# Patient Record
Sex: FEMALE | Race: WHITE | NOT HISPANIC OR LATINO | Employment: FULL TIME | URBAN - METROPOLITAN AREA
[De-identification: names, ages, dates, MRNs, and addresses within clinical notes are randomized per-mention and may not be internally consistent; named-entity substitution may affect disease eponyms.]

---

## 2023-05-08 LAB — SARS-COV-2 RESULT: NOT DETECTED

## 2023-05-11 ENCOUNTER — APPOINTMENT (OUTPATIENT)
Dept: PRIMARY CARE | Facility: CLINIC | Age: 38
End: 2023-05-11
Payer: COMMERCIAL

## 2023-05-11 ENCOUNTER — DOCUMENTATION (OUTPATIENT)
Dept: PRIMARY CARE | Facility: CLINIC | Age: 38
End: 2023-05-11
Payer: COMMERCIAL

## 2023-05-11 DIAGNOSIS — J06.9 VIRAL UPPER RESPIRATORY TRACT INFECTION: Primary | ICD-10-CM

## 2023-05-11 ASSESSMENT — ENCOUNTER SYMPTOMS
COUGH: 1
SORE THROAT: 1
SHORTNESS OF BREATH: 0
VOMITING: 0
WHEEZING: 0
HEADACHES: 0
FATIGUE: 1
TROUBLE SWALLOWING: 0
DIZZINESS: 0
FEVER: 0
NAUSEA: 0

## 2023-05-11 NOTE — PROGRESS NOTES
Subjective   Patient ID: Freya Sanz is a 37 y.o. female who presents for No chief complaint on file..  URI sx  Began 6 days ago with ST, some nasal congestion but very fatigued  Some cough, no F/C  COVID (-)  Some TheraFlu helping and feels better overall other than fatigue.  Works with [employer]        Review of Systems   Constitutional:  Positive for fatigue. Negative for fever.   HENT:  Positive for postnasal drip and sore throat. Negative for trouble swallowing.    Respiratory:  Positive for cough. Negative for shortness of breath and wheezing.    Cardiovascular:  Negative for chest pain and leg swelling.   Gastrointestinal:  Negative for nausea and vomiting.   Neurological:  Negative for dizziness and headaches.       Objective   Physical Exam  Constitutional:       Comments: NAD, mildly ill appearing   HENT:      Head: Normocephalic.      Nose:      Comments: Some mild nasal sx present  Pulmonary:      Effort: Pulmonary effort is normal.   Neurological:      General: No focal deficit present.      Mental Status: She is alert.         Assessment/Plan   Diagnoses and all orders for this visit:  Viral upper respiratory tract infection    Your symptoms are consistent with a viral infection and the progression you describe is typical.  Consider adding Astepro (OTC) and your favorite antihistamine ( no decongestant)  to help manage nasal symptoms and post nasal drainage.  If any of your symptoms return, or if you have a ny new ones, then let your PCP know.  The toatl course is usually 7-10 days.  Any sudden changes go to ED.

## 2023-09-21 PROBLEM — B97.7 HPV IN FEMALE: Status: ACTIVE | Noted: 2023-09-21

## 2023-09-21 PROBLEM — D22.5 MELANOCYTIC NEVI OF TRUNK: Status: ACTIVE | Noted: 2023-01-05

## 2023-09-21 PROBLEM — M54.16 LEFT LUMBAR RADICULOPATHY: Status: ACTIVE | Noted: 2023-09-21

## 2023-09-21 PROBLEM — S99.921A RIGHT FOOT INJURY, INITIAL ENCOUNTER: Status: ACTIVE | Noted: 2023-09-21

## 2023-09-21 PROBLEM — M54.42 CHRONIC LEFT-SIDED LOW BACK PAIN WITH LEFT-SIDED SCIATICA: Status: ACTIVE | Noted: 2023-09-21

## 2023-09-21 PROBLEM — F17.200 CURRENT SMOKER: Status: ACTIVE | Noted: 2023-09-21

## 2023-09-21 PROBLEM — M54.9 BACK PAIN: Status: ACTIVE | Noted: 2023-09-21

## 2023-09-21 PROBLEM — M53.3 SACRAL BACK PAIN: Status: ACTIVE | Noted: 2023-09-21

## 2023-09-21 PROBLEM — M46.1 SACROILIITIS (CMS-HCC): Status: ACTIVE | Noted: 2023-09-21

## 2023-09-21 PROBLEM — M99.09 SEGMENTAL AND SOMATIC DYSFUNCTION: Status: ACTIVE | Noted: 2023-09-21

## 2023-09-21 PROBLEM — M54.50 ACUTE LEFT-SIDED LOW BACK PAIN WITHOUT SCIATICA: Status: ACTIVE | Noted: 2023-09-21

## 2023-09-21 PROBLEM — H52.13 BILATERAL MYOPIA: Status: ACTIVE | Noted: 2023-09-21

## 2023-09-21 PROBLEM — D22.70 MELANOCYTIC NEVI OF LOWER EXTREMITY OR HIP: Status: ACTIVE | Noted: 2023-01-05

## 2023-09-21 PROBLEM — M79.10 MYALGIA: Status: ACTIVE | Noted: 2023-09-21

## 2023-09-21 PROBLEM — R10.2 PELVIC PAIN IN FEMALE: Status: ACTIVE | Noted: 2023-09-21

## 2023-09-21 PROBLEM — S93.401A SPRAIN OF RIGHT ANKLE, INITIAL ENCOUNTER: Status: ACTIVE | Noted: 2023-09-21

## 2023-09-21 PROBLEM — S92.514A CLOSED NONDISPLACED FRACTURE OF PROXIMAL PHALANX OF LESSER TOE OF RIGHT FOOT: Status: ACTIVE | Noted: 2023-09-21

## 2023-09-21 PROBLEM — M54.2 CERVICALGIA: Status: ACTIVE | Noted: 2023-09-21

## 2023-09-21 PROBLEM — L81.4 OTHER MELANIN HYPERPIGMENTATION: Status: ACTIVE | Noted: 2023-01-05

## 2023-09-21 PROBLEM — D18.01 HEMANGIOMA OF SKIN AND SUBCUTANEOUS TISSUE: Status: ACTIVE | Noted: 2023-01-05

## 2023-09-21 PROBLEM — L57.8 OTHER SKIN CHANGES DUE TO CHRONIC EXPOSURE TO NONIONIZING RADIATION: Status: ACTIVE | Noted: 2023-01-05

## 2023-09-21 PROBLEM — Z86.39 HISTORY OF VITAMIN D DEFICIENCY: Status: ACTIVE | Noted: 2023-09-21

## 2023-09-21 PROBLEM — E55.9 VITAMIN D INSUFFICIENCY: Status: ACTIVE | Noted: 2023-09-21

## 2023-09-21 PROBLEM — M79.9 POSTURAL STRAIN: Status: ACTIVE | Noted: 2023-09-21

## 2023-09-21 PROBLEM — M47.817 SPONDYLOSIS, LUMBOSACRAL: Status: ACTIVE | Noted: 2023-09-21

## 2023-09-21 PROBLEM — G89.29 CHRONIC LEFT-SIDED LOW BACK PAIN WITH LEFT-SIDED SCIATICA: Status: ACTIVE | Noted: 2023-09-21

## 2023-09-21 PROBLEM — L81.3 CAFE AU LAIT SPOTS: Status: ACTIVE | Noted: 2023-01-05

## 2023-09-21 PROBLEM — F17.200 NICOTINE USE DISORDER: Status: ACTIVE | Noted: 2023-09-21

## 2023-09-21 PROBLEM — R06.02 SHORTNESS OF BREATH ON EXERTION: Status: ACTIVE | Noted: 2023-09-21

## 2023-09-21 PROBLEM — D22.39 MELANOCYTIC NEVI OF OTHER PARTS OF FACE: Status: ACTIVE | Noted: 2023-01-05

## 2023-09-21 PROBLEM — F33.42 MAJOR DEPRESSIVE DISORDER, RECURRENT EPISODE, IN FULL REMISSION (CMS-HCC): Status: ACTIVE | Noted: 2023-09-21

## 2023-09-21 RX ORDER — CLINDAMYCIN PHOSPHATE 1 G/10ML
GEL TOPICAL
COMMUNITY
Start: 2022-12-08 | End: 2023-10-05 | Stop reason: ALTCHOICE

## 2023-09-21 RX ORDER — SERTRALINE HYDROCHLORIDE 100 MG/1
2 TABLET, FILM COATED ORAL DAILY
COMMUNITY
Start: 2015-01-13 | End: 2023-10-18 | Stop reason: SDUPTHER

## 2023-09-21 RX ORDER — HYDROXYZINE HYDROCHLORIDE 10 MG/1
TABLET, FILM COATED ORAL
COMMUNITY
Start: 2023-08-10 | End: 2024-03-14 | Stop reason: SDUPTHER

## 2023-09-21 RX ORDER — MELATONIN 10 MG/ML
DROPS ORAL
COMMUNITY
Start: 2019-12-05

## 2023-09-21 RX ORDER — CLINDAMYCIN PHOSPHATE 10 MG/G
GEL TOPICAL
COMMUNITY
Start: 2023-01-05

## 2023-09-21 RX ORDER — IBUPROFEN 800 MG/1
1 TABLET ORAL 3 TIMES DAILY PRN
COMMUNITY
Start: 2021-09-27

## 2023-09-21 RX ORDER — NORETHINDRONE 5 MG/1
1 TABLET ORAL DAILY
COMMUNITY
Start: 2021-03-04 | End: 2023-10-19 | Stop reason: SDUPTHER

## 2023-09-21 RX ORDER — BUPROPION HYDROCHLORIDE 150 MG/1
1 TABLET, EXTENDED RELEASE ORAL DAILY
COMMUNITY
Start: 2019-12-02 | End: 2023-10-18 | Stop reason: SDUPTHER

## 2023-09-26 DIAGNOSIS — E55.9 VITAMIN D INSUFFICIENCY: Primary | ICD-10-CM

## 2023-09-26 DIAGNOSIS — Z00.00 ANNUAL PHYSICAL EXAM: ICD-10-CM

## 2023-10-04 ENCOUNTER — LAB (OUTPATIENT)
Dept: LAB | Facility: LAB | Age: 38
End: 2023-10-04
Payer: COMMERCIAL

## 2023-10-04 DIAGNOSIS — Z00.00 ANNUAL PHYSICAL EXAM: ICD-10-CM

## 2023-10-04 DIAGNOSIS — E55.9 VITAMIN D INSUFFICIENCY: ICD-10-CM

## 2023-10-04 LAB
25(OH)D3 SERPL-MCNC: 35 NG/ML (ref 30–100)
CHOLEST SERPL-MCNC: 149 MG/DL (ref 0–199)
CHOLESTEROL/HDL RATIO: 3.8
EST. AVERAGE GLUCOSE BLD GHB EST-MCNC: 108 MG/DL
HBA1C MFR BLD: 5.4 %
HDLC SERPL-MCNC: 38.9 MG/DL
LDLC SERPL CALC-MCNC: 94 MG/DL (ref 140–190)
NON HDL CHOLESTEROL: 110 MG/DL (ref 0–149)
TRIGL SERPL-MCNC: 83 MG/DL (ref 0–149)
TSH SERPL-ACNC: 2.71 MIU/L (ref 0.44–3.98)
VLDL: 17 MG/DL (ref 0–40)

## 2023-10-04 PROCEDURE — 36415 COLL VENOUS BLD VENIPUNCTURE: CPT

## 2023-10-05 ENCOUNTER — TELEMEDICINE (OUTPATIENT)
Dept: BEHAVIORAL HEALTH | Facility: CLINIC | Age: 38
End: 2023-10-05
Payer: COMMERCIAL

## 2023-10-05 ENCOUNTER — OFFICE VISIT (OUTPATIENT)
Dept: PRIMARY CARE | Facility: CLINIC | Age: 38
End: 2023-10-05
Payer: COMMERCIAL

## 2023-10-05 VITALS
SYSTOLIC BLOOD PRESSURE: 122 MMHG | HEIGHT: 62 IN | WEIGHT: 162.4 LBS | TEMPERATURE: 97.3 F | DIASTOLIC BLOOD PRESSURE: 70 MMHG | HEART RATE: 100 BPM | BODY MASS INDEX: 29.88 KG/M2 | OXYGEN SATURATION: 95 %

## 2023-10-05 DIAGNOSIS — Z63.8 STRESS DUE TO FAMILY TENSION: ICD-10-CM

## 2023-10-05 DIAGNOSIS — Z86.39 HISTORY OF VITAMIN D DEFICIENCY: ICD-10-CM

## 2023-10-05 DIAGNOSIS — F41.9 ANXIETY AND DEPRESSION: ICD-10-CM

## 2023-10-05 DIAGNOSIS — F41.9 ANXIETY: ICD-10-CM

## 2023-10-05 DIAGNOSIS — F32.A ANXIETY AND DEPRESSION: ICD-10-CM

## 2023-10-05 DIAGNOSIS — K29.00 OTHER ACUTE GASTRITIS WITHOUT HEMORRHAGE: Primary | ICD-10-CM

## 2023-10-05 DIAGNOSIS — F17.200 CURRENT SMOKER: ICD-10-CM

## 2023-10-05 PROBLEM — J06.9 VIRAL UPPER RESPIRATORY TRACT INFECTION: Status: RESOLVED | Noted: 2023-05-11 | Resolved: 2023-10-05

## 2023-10-05 PROBLEM — L70.0 ACNE VULGARIS: Status: ACTIVE | Noted: 2023-01-05

## 2023-10-05 PROBLEM — N93.9 ABNORMAL UTERINE BLEEDING (AUB): Status: ACTIVE | Noted: 2023-10-05

## 2023-10-05 PROBLEM — Q76.49 BERTOLOTTI'S SYNDROME: Status: ACTIVE | Noted: 2023-10-05

## 2023-10-05 PROBLEM — H52.203 ASTIGMATISM, BILATERAL: Status: ACTIVE | Noted: 2023-10-05

## 2023-10-05 PROBLEM — R87.610 ASCUS WITH POSITIVE HIGH RISK HPV CERVICAL: Status: ACTIVE | Noted: 2023-10-05

## 2023-10-05 PROBLEM — R87.810 ASCUS WITH POSITIVE HIGH RISK HPV CERVICAL: Status: ACTIVE | Noted: 2023-10-05

## 2023-10-05 PROCEDURE — 90837 PSYTX W PT 60 MINUTES: CPT | Performed by: COUNSELOR

## 2023-10-05 PROCEDURE — 99396 PREV VISIT EST AGE 40-64: CPT | Performed by: NURSE PRACTITIONER

## 2023-10-05 RX ORDER — OMEPRAZOLE 40 MG/1
40 CAPSULE, DELAYED RELEASE ORAL AS NEEDED
COMMUNITY
End: 2023-10-18 | Stop reason: SDUPTHER

## 2023-10-05 SDOH — SOCIAL STABILITY - SOCIAL INSECURITY: OTHER SPECIFIED PROBLEMS RELATED TO PRIMARY SUPPORT GROUP: Z63.8

## 2023-10-05 ASSESSMENT — ENCOUNTER SYMPTOMS
CARDIOVASCULAR NEGATIVE: 1
APPETITE CHANGE: 1
RESPIRATORY NEGATIVE: 1
ALLERGIC/IMMUNOLOGIC NEGATIVE: 1
EYES NEGATIVE: 1
MYALGIAS: 1
GASTROINTESTINAL NEGATIVE: 1
NEUROLOGICAL NEGATIVE: 1
NERVOUS/ANXIOUS: 1

## 2023-10-05 ASSESSMENT — PAIN SCALES - GENERAL: PAINLEVEL: 0-NO PAIN

## 2023-10-05 NOTE — ASSESSMENT & PLAN NOTE
Significant, ongoing family stressors   Support from 2 counselors and psychiatry. Reports having access to mental health resources

## 2023-10-05 NOTE — ASSESSMENT & PLAN NOTE
Anxiety stable on wellbutrin and zoloft   Discussed non-pharmacologic stress relief techniques, pt interested in stretching every day

## 2023-10-05 NOTE — PATIENT INSTRUCTIONS
Start stretching, google full body beginner stretches  Establish some type of daily re;laxation activity for yourself.  There are apps like Calm or HeadSpace that are helpful    Your Vitamin D level/ you may supplement it with an over the counter Vitamin D supplement, 2000 units daily or a Multi Vitamin with D. Look for the USP label so you know the supplement is verified to be pure and not fillers.   If you chooses not to take a supplement look for fortified foods like OJ, Soy milk, Cereal or natural sources Egg yolks, [place], Canned tuna, mushrooms, Sardines,    You are not alone  Everyone has struggles in life  Many people are out there to listen, and help, and not  you  Never be afraid to ask    Here is a list of Mental Health Resources;    Suicide and Crisis Hotline  DIAL 988    Mobile Crisis  160.283.6490   or Text 4HOPE to 216463    Suicide Prevention Lifeline  1-435.928.8405    Recovery Resources   [address]  [phone]  Substance Abuse  Mental Health  Psychiatric Emergency Walk In clinic    [facility]  [address] [facility]   [phone]   Substance abuse   Psychiatric Emergency Walk In clinic      Top rated On Line Resources; Just Google the name for the link to follow    Anne-Marie     Cerebral     Talkspace    Teen Counseling    Pride Counseling     Health Maintenance  Continue to follow a healthy diet with fruits and vegetables at most meals.  Daily exercise is recommended as well as adding weights 3 times a week to maintain muscle mass and for bone health.  It is recommended you drink 6 to 8 glasses of water daily, more when you are exerting yourself or in hot weather.  Make sure you follow the health screening guidelines and keep up to date on your immunizations!

## 2023-10-05 NOTE — ASSESSMENT & PLAN NOTE
9/25 ED visit, poor diet, significant home stress, daily alcohol use and current smoker  Has follow up with GI in 1 week  Continue Omeprazole PRN for heartburn symptoms  Small, bland meals 5 times per day
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
## 2023-10-12 ENCOUNTER — OFFICE VISIT (OUTPATIENT)
Dept: GASTROENTEROLOGY | Facility: CLINIC | Age: 38
End: 2023-10-12
Payer: COMMERCIAL

## 2023-10-12 VITALS
WEIGHT: 163 LBS | HEART RATE: 85 BPM | HEIGHT: 62 IN | TEMPERATURE: 98 F | BODY MASS INDEX: 30 KG/M2 | DIASTOLIC BLOOD PRESSURE: 81 MMHG | SYSTOLIC BLOOD PRESSURE: 117 MMHG

## 2023-10-12 DIAGNOSIS — R10.13 EPIGASTRIC ABDOMINAL PAIN: Primary | ICD-10-CM

## 2023-10-12 DIAGNOSIS — K29.00 ACUTE SUPERFICIAL GASTRITIS WITHOUT HEMORRHAGE: ICD-10-CM

## 2023-10-12 DIAGNOSIS — R14.0 BLOATING: ICD-10-CM

## 2023-10-12 DIAGNOSIS — K21.9 GASTROESOPHAGEAL REFLUX DISEASE WITHOUT ESOPHAGITIS: ICD-10-CM

## 2023-10-12 PROCEDURE — 99213 OFFICE O/P EST LOW 20 MIN: CPT | Performed by: NURSE PRACTITIONER

## 2023-10-12 PROCEDURE — 99203 OFFICE O/P NEW LOW 30 MIN: CPT | Performed by: NURSE PRACTITIONER

## 2023-10-12 RX ORDER — OMEPRAZOLE 20 MG/1
20 CAPSULE, DELAYED RELEASE ORAL ONCE
Status: SHIPPED | OUTPATIENT
Start: 2023-10-12

## 2023-10-12 NOTE — PROGRESS NOTES
Freya Sanz is a 38 y.o. female with a PMH of major depressive disorder, PTSD   who presents today for  epigastric pain.  Seen in the ED, had a GI cocktail. Symptoms have been present for approximately 2 day. Symptoms include abdominal bloating, belching, early satiety, heartburn, and midespigastric pain. The patient denies   chest pain, choking on food, cough, difficulty swallowing, early satiety, hematemesis, hoarseness, melena, midespigastric pain, and odynophagia The patient denies early satiety. Symptoms appear to be worsened by alcohol, caffeine, and large meals. Risk factors present for GERD include tobacco abuse, alcohol use, and stress .  Studies performed so far include none. Treatments tried so far include Omeprazole but she is not using this daily. Results of treatment: considerable improvement in symptom frequency.     Past Surgical History:   Procedure Laterality Date    MOUTH SURGERY  02/25/2016    Oral Surgery Tooth Extraction    TONSILLECTOMY  02/25/2016    Tonsillectomy     Family Hx: Cancer-related family history is not on file. Denies a family hx of CRC, GI cancers, Oneal's esophagus   Social history: alcohol intake is 1-3 beers daily, smoking 3 cigarettes daily         Current Outpatient Medications   Medication Sig Dispense Refill    buPROPion SR (Wellbutrin SR) 150 mg 12 hr tablet Take 1 tablet (150 mg) by mouth once daily.      cholecalciferol, vitamin D3, 50 mcg (2,000 unit) tablet,chewable Chew.      clindamycin (Clindagel) 1 % gel       hydrOXYzine HCL (Atarax) 10 mg tablet Take by mouth.      ibuprofen 800 mg tablet Take 1 tablet (800 mg) by mouth 3 times a day as needed.      multivitamin-Ca-iron-minerals (Tab-A-Albert Womens) 27-0.4 mg tablet       naloxone (Narcan) 4 mg/0.1 mL nasal spray INSTILL 1 SPRAY IN ONE NOSTRIL AS NEEDED FOR ACCIDENTAL OPIOID OVERDOSE; REPEAT WITH SECOND DOSE IN 5 MINUTES IF NO RESPONSE 4 each 0    norethindrone (Aygestin) 5 mg tablet Take 1 tablet (5 mg)  by mouth once daily.      omeprazole (PriLOSEC) 40 mg DR capsule Take 1 capsule (40 mg) by mouth if needed (if symptomatic). Do not crush or chew.      sertraline (Zoloft) 100 mg tablet Take 2 tablets (200 mg) by mouth once daily.       No current facility-administered medications for this visit.   Review of Systems   Constitutional:  Negative for chills, diaphoresis, fatigue and fever.   HENT: Negative.     Eyes: Negative.    Respiratory: Negative.  Negative for apnea, cough, chest tightness, shortness of breath, wheezing and stridor.    Cardiovascular: Negative.    Gastrointestinal:         See HPI    Endocrine: Negative.    Genitourinary: Negative.  Negative for difficulty urinating.   Musculoskeletal: Negative.    Skin: Negative.    Allergic/Immunologic: Negative.    Neurological: Negative.    Hematological: Negative.    Psychiatric/Behavioral: Negative.     All other systems reviewed and are negative.       Physical Exam  Constitutional:       Appearance: She is normal weight.   Eyes:      Pupils: Pupils are equal, round, and reactive to light.   Cardiovascular:      Rate and Rhythm: Normal rate and regular rhythm.      Heart sounds: Normal heart sounds.   Pulmonary:      Effort: Pulmonary effort is normal.      Breath sounds: Normal breath sounds.   Abdominal:      Tenderness: There is abdominal tenderness.   Musculoskeletal:      Cervical back: Normal range of motion.   Skin:     General: Skin is warm and dry.   Neurological:      Mental Status: She is alert.   Psychiatric:         Mood and Affect: Mood normal.           ASSESSMENT:  DYSPEPSIA, BLOATING       PLAN:  The pathophysiology of reflux is discussed.  Anti-reflux measures such as raising the head of the bed, avoiding tight clothing or belts, avoiding eating 3-4 hours before bedtime and not lying down shortly after mealtime and achieving weight loss are discussed. Avoid ASA, NSAID's, caffeine, chocolate, peppermints, spicy and fatty foods, alcohol and  tobacco. Further recommendations to her: Start Omeprazole  daily about 30-60 minutes prior to breakfast. At this time an EGD is warranted for evaluation of New-onset dyspepsia Follow-up in 1-2 months.

## 2023-10-12 NOTE — PATIENT INSTRUCTIONS
GERD - Avoid overeating at mealtime.  Stay upright for 2 hours after eating.  Do not eat for 3-4 hours before going to bed.  Elevated the head of the bed 6 inches when you are sleeping. Avoid excessive amounts of fried foods, acidic foods, spicy foods, alcohol, caffeine, peppermint, spearmint, chocolate, non-steroidal anti- inflammatory medications, such as, Ibuprofen, Advil, Motrin, Aleve, Naproxen, Naprosyn, Moloxicam, Etodolac, and Voltaren.     Start Omeprazole 20 mg daily about 30 minutes before breakfast    Follow up in 2 months

## 2023-10-13 PROBLEM — R10.13 EPIGASTRIC ABDOMINAL PAIN: Status: ACTIVE | Noted: 2023-10-13

## 2023-10-13 PROBLEM — R14.0 BLOATING: Status: ACTIVE | Noted: 2023-10-13

## 2023-10-13 ASSESSMENT — ENCOUNTER SYMPTOMS
FATIGUE: 0
WHEEZING: 0
PSYCHIATRIC NEGATIVE: 1
CHEST TIGHTNESS: 0
APNEA: 0
CHILLS: 0
ALLERGIC/IMMUNOLOGIC NEGATIVE: 1
MUSCULOSKELETAL NEGATIVE: 1
STRIDOR: 0
NEUROLOGICAL NEGATIVE: 1
ENDOCRINE NEGATIVE: 1
COUGH: 0
SHORTNESS OF BREATH: 0
ROS GI COMMENTS: SEE HPI
DIFFICULTY URINATING: 0
DIAPHORESIS: 0
FEVER: 0
CARDIOVASCULAR NEGATIVE: 1
RESPIRATORY NEGATIVE: 1
EYES NEGATIVE: 1
HEMATOLOGIC/LYMPHATIC NEGATIVE: 1

## 2023-10-18 ENCOUNTER — PATIENT MESSAGE (OUTPATIENT)
Dept: OBSTETRICS AND GYNECOLOGY | Facility: CLINIC | Age: 38
End: 2023-10-18
Payer: COMMERCIAL

## 2023-10-18 ENCOUNTER — PHARMACY VISIT (OUTPATIENT)
Dept: PHARMACY | Facility: CLINIC | Age: 38
End: 2023-10-18
Payer: COMMERCIAL

## 2023-10-18 DIAGNOSIS — F41.9 ANXIETY: ICD-10-CM

## 2023-10-18 DIAGNOSIS — R10.13 EPIGASTRIC ABDOMINAL PAIN: Primary | ICD-10-CM

## 2023-10-18 DIAGNOSIS — N93.9 ABNORMAL UTERINE BLEEDING (AUB): Primary | ICD-10-CM

## 2023-10-18 DIAGNOSIS — F33.42 MAJOR DEPRESSIVE DISORDER, RECURRENT EPISODE, IN FULL REMISSION (CMS-HCC): ICD-10-CM

## 2023-10-18 PROCEDURE — RXMED WILLOW AMBULATORY MEDICATION CHARGE

## 2023-10-18 RX ORDER — BUPROPION HYDROCHLORIDE 150 MG/1
150 TABLET, EXTENDED RELEASE ORAL DAILY
Qty: 90 TABLET | Refills: 0 | Status: SHIPPED | OUTPATIENT
Start: 2023-10-18 | End: 2023-11-14 | Stop reason: SDUPTHER

## 2023-10-18 RX ORDER — SERTRALINE HYDROCHLORIDE 100 MG/1
200 TABLET, FILM COATED ORAL DAILY
Qty: 180 TABLET | Refills: 0 | Status: SHIPPED | OUTPATIENT
Start: 2023-10-18 | End: 2023-12-28 | Stop reason: SDUPTHER

## 2023-10-18 RX ORDER — OMEPRAZOLE 40 MG/1
40 CAPSULE, DELAYED RELEASE ORAL AS NEEDED
Qty: 30 CAPSULE | Refills: 2 | Status: SHIPPED | OUTPATIENT
Start: 2023-10-18 | End: 2024-03-17

## 2023-10-19 ENCOUNTER — APPOINTMENT (OUTPATIENT)
Dept: BEHAVIORAL HEALTH | Facility: CLINIC | Age: 38
End: 2023-10-19
Payer: COMMERCIAL

## 2023-10-19 ENCOUNTER — PHARMACY VISIT (OUTPATIENT)
Dept: PHARMACY | Facility: CLINIC | Age: 38
End: 2023-10-19
Payer: COMMERCIAL

## 2023-10-19 PROCEDURE — RXMED WILLOW AMBULATORY MEDICATION CHARGE

## 2023-10-19 RX ORDER — NORETHINDRONE 5 MG/1
5 TABLET ORAL DAILY
Qty: 90 TABLET | Refills: 3 | Status: SHIPPED | OUTPATIENT
Start: 2023-10-19

## 2023-11-09 ENCOUNTER — TELEMEDICINE (OUTPATIENT)
Dept: BEHAVIORAL HEALTH | Facility: CLINIC | Age: 38
End: 2023-11-09
Payer: COMMERCIAL

## 2023-11-09 DIAGNOSIS — F41.8 MIXED ANXIETY AND DEPRESSIVE DISORDER: ICD-10-CM

## 2023-11-09 PROCEDURE — 90837 PSYTX W PT 60 MINUTES: CPT | Performed by: COUNSELOR
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
## 2023-11-14 DIAGNOSIS — F33.42 MAJOR DEPRESSIVE DISORDER, RECURRENT EPISODE, IN FULL REMISSION (CMS-HCC): ICD-10-CM

## 2023-11-15 ENCOUNTER — LAB REQUISITION (OUTPATIENT)
Dept: LAB | Facility: HOSPITAL | Age: 38
End: 2023-11-15
Payer: COMMERCIAL

## 2023-11-15 DIAGNOSIS — U07.1 COVID-19: ICD-10-CM

## 2023-11-15 LAB — SARS-COV-2 RNA RESP QL NAA+PROBE: DETECTED

## 2023-11-15 PROCEDURE — 87635 SARS-COV-2 COVID-19 AMP PRB: CPT

## 2023-11-15 RX ORDER — BUPROPION HYDROCHLORIDE 150 MG/1
150 TABLET, EXTENDED RELEASE ORAL DAILY
Qty: 90 TABLET | Refills: 0 | Status: SHIPPED | OUTPATIENT
Start: 2023-11-15 | End: 2023-12-28 | Stop reason: SDUPTHER

## 2023-11-17 ENCOUNTER — PHARMACY VISIT (OUTPATIENT)
Dept: PHARMACY | Facility: CLINIC | Age: 38
End: 2023-11-17
Payer: COMMERCIAL

## 2023-11-17 PROCEDURE — RXMED WILLOW AMBULATORY MEDICATION CHARGE

## 2023-11-30 ENCOUNTER — TELEMEDICINE (OUTPATIENT)
Dept: BEHAVIORAL HEALTH | Facility: CLINIC | Age: 38
End: 2023-11-30
Payer: COMMERCIAL

## 2023-11-30 DIAGNOSIS — F41.8 MIXED ANXIETY AND DEPRESSIVE DISORDER: ICD-10-CM

## 2023-11-30 PROCEDURE — 90937 HEMODIALYSIS REPEATED EVAL: CPT | Performed by: COUNSELOR

## 2023-11-30 NOTE — PROGRESS NOTES
Start time: 10:05  am  End time: 11:00 am  Total time: 55 minutes  Telehealth visit, pt consented  Diagnosis: Mixed depression and anxiety  Last visit: 11/9/2023  Freya was treated for Covid a few weeks ago and is still dealing with a cough.  She spent the Thanksgiving holiday at home with her , her 2 stepsons joined later that day. She delivered a plate to her brother who lives alone and is dealing with depression. PT returned to school 2 weeks ago and feels proud that she is ahead of her assignments.  Work is presenting a few challenges with changes, pt denies being worried.   Home life has been less stressful. Norm is participating in family therapy sessions and has been a bit more cooperative in the home. He is currently suspended from school,  due to bullying behavior for the remainder of the semester. PT continues to report symptoms of SAD. She  takes walks, eats healthy and uses positive self talk to manage symptoms. She denies anxiety or depression today.  PT notes feeling safe in her home.  Provider reviewed CBT cognitive triangle. Helped to identify ways to continue to support her family members while maintaining healthy boundaries. Reinforced consistent self care. F/U 2 weeks

## 2023-12-06 ENCOUNTER — APPOINTMENT (OUTPATIENT)
Dept: BEHAVIORAL HEALTH | Facility: CLINIC | Age: 38
End: 2023-12-06
Payer: COMMERCIAL

## 2023-12-07 ENCOUNTER — TELEMEDICINE (OUTPATIENT)
Dept: BEHAVIORAL HEALTH | Facility: CLINIC | Age: 38
End: 2023-12-07
Payer: COMMERCIAL

## 2023-12-07 DIAGNOSIS — F41.8 MIXED ANXIETY AND DEPRESSIVE DISORDER: ICD-10-CM

## 2023-12-07 PROCEDURE — 90834 PSYTX W PT 45 MINUTES: CPT | Performed by: COUNSELOR
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
## 2023-12-14 ENCOUNTER — TELEMEDICINE (OUTPATIENT)
Dept: BEHAVIORAL HEALTH | Facility: CLINIC | Age: 38
End: 2023-12-14
Payer: COMMERCIAL

## 2023-12-14 DIAGNOSIS — F43.21 GRIEF: ICD-10-CM

## 2023-12-14 DIAGNOSIS — F41.8 MIXED ANXIETY AND DEPRESSIVE DISORDER: ICD-10-CM

## 2023-12-14 PROCEDURE — 90837 PSYTX W PT 60 MINUTES: CPT | Performed by: COUNSELOR
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
## 2023-12-21 ENCOUNTER — TELEMEDICINE (OUTPATIENT)
Dept: BEHAVIORAL HEALTH | Facility: CLINIC | Age: 38
End: 2023-12-21
Payer: COMMERCIAL

## 2023-12-21 DIAGNOSIS — F41.8 MIXED ANXIETY AND DEPRESSIVE DISORDER: ICD-10-CM

## 2023-12-21 DIAGNOSIS — F43.21 GRIEVING: ICD-10-CM

## 2023-12-21 PROCEDURE — 90837 PSYTX W PT 60 MINUTES: CPT | Performed by: COUNSELOR

## 2023-12-21 NOTE — PROGRESS NOTES
"Start time: 1:36 pm  End time: 2:30 pm  Total time: 54 minutes  Telehealth visit, pt consented  Diagnosis: F41.8   Last visit: 12/14/2023 Saturday was the [facility] service for her brother Paras. Overall Freya was pleased with the turn out.  She returned to work on Monday after being off all last week, said \"it was okay.\" Tuesday was more difficult because she was not as busy. Her  Shane continues to be supportive, the stress in the home with Norm has been less this past week. Provider joined. Supported in sharing thoughts/feelings. Offered support.  Processed concerned that her mother wants to keep the lines of communication open with her.  PT is triggered with childhood memories. Remainder of the visit was spent supporting pt as she reflected on family memories. More psychoeducation was given on grieving and the tasks of mourning. Reviewed/reinforced self care and healthy coping. Used supportive therapy.  HW-  Journal  Review recommended readings  Recommended joining a grief group  Recommended reading The Grief Recovery Handbook   "

## 2023-12-28 ENCOUNTER — TELEMEDICINE (OUTPATIENT)
Dept: BEHAVIORAL HEALTH | Facility: CLINIC | Age: 38
End: 2023-12-28
Payer: COMMERCIAL

## 2023-12-28 ENCOUNTER — APPOINTMENT (OUTPATIENT)
Dept: DERMATOLOGY | Facility: CLINIC | Age: 38
End: 2023-12-28
Payer: COMMERCIAL

## 2023-12-28 DIAGNOSIS — F41.8 MIXED ANXIETY AND DEPRESSIVE DISORDER: ICD-10-CM

## 2023-12-28 DIAGNOSIS — F41.9 ANXIETY: ICD-10-CM

## 2023-12-28 DIAGNOSIS — F33.42 MAJOR DEPRESSIVE DISORDER, RECURRENT EPISODE, IN FULL REMISSION (CMS-HCC): ICD-10-CM

## 2023-12-28 DIAGNOSIS — F43.21 GRIEF: ICD-10-CM

## 2023-12-28 DIAGNOSIS — F33.0 MILD EPISODE OF RECURRENT MAJOR DEPRESSIVE DISORDER (CMS-HCC): ICD-10-CM

## 2023-12-28 DIAGNOSIS — F41.0 PANIC DISORDER: ICD-10-CM

## 2023-12-28 PROCEDURE — 90837 PSYTX W PT 60 MINUTES: CPT | Performed by: COUNSELOR

## 2023-12-28 PROCEDURE — 99213 OFFICE O/P EST LOW 20 MIN: CPT | Performed by: CLINICAL NURSE SPECIALIST

## 2023-12-28 PROCEDURE — RXMED WILLOW AMBULATORY MEDICATION CHARGE

## 2023-12-28 RX ORDER — BUPROPION HYDROCHLORIDE 150 MG/1
150 TABLET, EXTENDED RELEASE ORAL DAILY
Qty: 90 TABLET | Refills: 0 | Status: SHIPPED | OUTPATIENT
Start: 2023-12-28 | End: 2024-01-25 | Stop reason: SDUPTHER

## 2023-12-28 RX ORDER — SERTRALINE HYDROCHLORIDE 100 MG/1
200 TABLET, FILM COATED ORAL DAILY
Qty: 180 TABLET | Refills: 0 | Status: SHIPPED | OUTPATIENT
Start: 2023-12-28 | End: 2024-01-25 | Stop reason: SDUPTHER

## 2023-12-28 RX ORDER — BUSPIRONE HYDROCHLORIDE 5 MG/1
5 TABLET ORAL 2 TIMES DAILY
Qty: 180 TABLET | Refills: 0 | Status: SHIPPED | OUTPATIENT
Start: 2023-12-28 | End: 2024-01-25 | Stop reason: SDUPTHER
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
## 2023-12-28 NOTE — PROGRESS NOTES
Outpatient Psychiatry      Subjective   Freya Sanz, a 38 y.o. female, presents for a routine follow up appointment as an established patient for medication management /outpatient psychiatry      Diagnosis:   Anxiety disorder   · Major depressive disorder, recurrent episode   · Panic disorder      Treatment Goals:  Specify outcomes written in observable, behavioral terms:   Maintain stability of mental health and adhere to treatment     Treatment Plan/Recommendations:   4-6 weeks follow up , can continue self care and wellness efforts , can call  for treatment and scheduling concerns , can maintain routine health screenings   Follow-up plan was discussed with patient.    Review with patient: Treatment plan reviewed with the patient.  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   no altered thoughts   mood is stable , she was having higher anxiety related to family stressors and she spoke about her thoughts and feelings about this   no concerns about memory issues   no history of emanuel or hypomania   reconciled medication list in the [facility] emr and provided psychoeducation       Review of Systems     Depressive Symptoms: fatigue, but~mild depressed ,~no loss of interest,~no change in appetite,~no recent lb weight gain,~no recent lb weight loss,~no insomnia,~not feeling worthless or guilty,~normal concentration,~ability to make decisions,~no suicidal ideation,~no guns or weapons in household.   Manic Symptoms: mood is not irritable or elevated,~self esteem is not grandiose or increased,~no changes in need for sleep,~not more talkative than usual,~does not have flight of ideas or racing thoughts,~no distractibility,~no psychomotor agitation or increased goal-directed activity,~no excessive involvement in pleasurable activities.   Psychotic Symptoms: no  hallucinations,~no delusions,~no disorganized speech,~does not have disorganized behavior or catatonia,~no negative symptoms.   Anxiety Symptoms: difficulty controlling worry,~increased arousal,~restlessness / feeling on edge due to worry, but~no panic attacks,~no concerns about future panic attacks,~no worry about panic attack consequences,~no change in behavior due to panic attacks,~no excessive worry,~not easily fatigued due to worry,~no difficulty concentrating due to worry,~no irritability due to worry,~no muscle tension due to worry,~no sleep disturbances due to worry,~no specific phobia,~no social phobia,~no obsessions,~no compulsions,~no exposure to traumatic event,~no re-experiencing of traumatic event,~no avoidance of stimuli and number of responsiveness.   Delirium/ Altered Mental Status Symptoms: no disturbances of consciousness,~no diminished ability to focus, sustain, shift attention,~no change in cognition or perceptual disturbances,~symptoms do not fluctuate during the course of the day,~general medical condition is not present.   Disordered Eating Symptoms: weight is not less than 85% of ideal body weight,~no intense fear of gaining weight,~does not have a poor body image,~no restricting of diet and/or excessive exercise,~no purging or laxative use.   Post-traumatic stress disorder symptoms The patient is currently asymptomatic.   Inattentive Symptoms: does not make careless mistakes often,~does not have difficulty paying attention,~not often disorganized,~does not lose things often,~is not easily distracted,~is not often forgetful,~does not avoid/dislike tasks with sustained mental effort,~listens when spoken to directly,~is able to follow instructions and finish schoolwork.   Conduct Issues: no aggression towards people or animals,~no destruction of property,~no deceitfulness,~does not violate rules.   Other Symptoms/ Concerns: no symptoms of separation anxiety,~no reactive attachment symptoms,~no  motor tics,~no vocal tics,~no stuttering,~no phonological problems,~no loss of urine control,~no encopresis,~no intellectual disability,~no self-injurious behaviors,~not somatic and no conversion symptoms,~no gender identity symptoms,~no sleep disorder symptoms,~no impulse control symptoms,~no personality disorder symptoms.       Constitutional: no sleep apnea,~normal sleeping,~no night wakings,~no snoring,~not a picky eater,~normal appetite,~no swallowing problems,~no night terrors,~no nightmares,~no restless sleep,~no snorts/gasps~and~no obesity.   Eyes: no vision test,~no vision impairment,~does not wear glasses/contacts,~does not wear glassess/contacts~and~no blindness.   ENT: no hearing tested,~no hearing loss,~no hearing aid,~no cochlear implant,~no excessive drooling,~no dental problems~and~no recurrent strep throat.   Cardiovascular: no murmur,~no heart defect,~no chest pain,~no palpitations~and~no syncope.   Respiratory: no wheezing~and~no asthma/reactive airway disease.   Gastrointestinal: no constipation,~no abdominal pain,~no nausea,~no vomiting,~no diarrhea,~no blood in stools,~no g-tube~and~no reflux.   Genitourinary: no nocturnal enuresis,~no diurnal enuresis~and~no incontinence.   Musculoskeletal: abnormal movement of extremities,~normal gait~and~no torticollis, but~no arthritis or joint problems,~no myalgias,~no muscle weakness~and~normal hand preference.   Integumentary: no changes in moles or birthmarks,~no rashes~and~no atopic dermatitis.   Neurological: no symmetrical facies,~no headache,~no head injury,~no seizures,~no staring spells,~no loss of consciousness,~no meningitis/encephalitis,~no cerebral palsy,~no spina bifida,~no stereotypy,~no developmental regression~and~no tics or twitches.   Endocrine: no temperature intolerance,~,~good growth~and~no failure to thrive.   Hematologic/Lymphatic: no anemia~and~no lead poisoning.          Current Outpatient Medications:     buPROPion SR (Wellbutrin  SR) 150 mg 12 hr tablet, Take 1 tablet (150 mg) by mouth once daily., Disp: 90 tablet, Rfl: 0    cholecalciferol, vitamin D3, 50 mcg (2,000 unit) tablet,chewable, Chew., Disp: , Rfl:     clindamycin (Clindagel) 1 % gel, , Disp: , Rfl:     hydrOXYzine HCL (Atarax) 10 mg tablet, Take by mouth., Disp: , Rfl:     ibuprofen 800 mg tablet, Take 1 tablet (800 mg) by mouth 3 times a day as needed., Disp: , Rfl:     multivitamin-Ca-iron-minerals (Tab-A-Albert Womens) 27-0.4 mg tablet, , Disp: , Rfl:     naloxone (Narcan) 4 mg/0.1 mL nasal spray, INSTILL 1 SPRAY IN ONE NOSTRIL AS NEEDED FOR ACCIDENTAL OPIOID OVERDOSE; REPEAT WITH SECOND DOSE IN 5 MINUTES IF NO RESPONSE, Disp: 4 each, Rfl: 0    norethindrone (Aygestin) 5 mg tablet, Take 1 tablet (5 mg) by mouth once daily., Disp: 90 tablet, Rfl: 3    omeprazole (PriLOSEC) 40 mg DR capsule, Take 1 capsule (40 mg) by mouth if needed (if symptomatic). Do not crush or chew., Disp: 30 capsule, Rfl: 2    sertraline (Zoloft) 100 mg tablet, Take 2 tablets (200 mg) by mouth once daily., Disp: 180 tablet, Rfl: 0    Current Facility-Administered Medications:     omeprazole (PriLOSEC) DR capsule 20 mg, 20 mg, oral, Once, Sheridan Trevizo, APRN-CNP  Medical History:  Past Medical History:   Diagnosis Date    Major depressive disorder, single episode, in partial remission (CMS/[facility]) 05/15/2017    Depression, major, single episode, in partial remission    Other specified health status     No pertinent past surgical history    Other specified health status     No pertinent past medical history    Post-traumatic stress disorder, unspecified     Post-traumatic stress disorder    Viral upper respiratory tract infection 05/11/2023     Surgical History:  Past Surgical History:   Procedure Laterality Date    MOUTH SURGERY  02/25/2016    Oral Surgery Tooth Extraction    TONSILLECTOMY  02/25/2016    Tonsillectomy     Family History:  No family history on file.  Social History:  Social History      Socioeconomic History    Marital status:      Spouse name: Not on file    Number of children: Not on file    Years of education: Not on file    Highest education level: Not on file   Occupational History    Not on file   Tobacco Use    Smoking status: Every Day     Packs/day: .25     Types: Cigarettes    Smokeless tobacco: Never   Vaping Use    Vaping Use: Never used   Substance and Sexual Activity    Alcohol use: Yes    Drug use: Never    Sexual activity: Defer   Other Topics Concern    Not on file   Social History Narrative    Not on file     Social Determinants of Health     Financial Resource Strain: Not on file   Food Insecurity: Not on file   Transportation Needs: Not on file   Physical Activity: Not on file   Stress: Not on file   Social Connections: Not on file   Intimate Partner Violence: Not on file   Housing Stability: Not on file     Record Review: brief     Vitals:  There were no vitals filed for this visit.    Carol Hodges, APRN-CNS

## 2023-12-29 ENCOUNTER — PHARMACY VISIT (OUTPATIENT)
Dept: PHARMACY | Facility: CLINIC | Age: 38
End: 2023-12-29
Payer: COMMERCIAL

## 2024-01-11 ENCOUNTER — APPOINTMENT (OUTPATIENT)
Dept: BEHAVIORAL HEALTH | Facility: CLINIC | Age: 39
End: 2024-01-11
Payer: COMMERCIAL

## 2024-01-11 PROCEDURE — RXMED WILLOW AMBULATORY MEDICATION CHARGE

## 2024-01-17 ENCOUNTER — PHARMACY VISIT (OUTPATIENT)
Dept: PHARMACY | Facility: CLINIC | Age: 39
End: 2024-01-17
Payer: COMMERCIAL

## 2024-01-18 ENCOUNTER — APPOINTMENT (OUTPATIENT)
Dept: BEHAVIORAL HEALTH | Facility: CLINIC | Age: 39
End: 2024-01-18
Payer: COMMERCIAL

## 2024-01-20 ENCOUNTER — APPOINTMENT (OUTPATIENT)
Dept: OPHTHALMOLOGY | Facility: CLINIC | Age: 39
End: 2024-01-20
Payer: COMMERCIAL

## 2024-01-25 ENCOUNTER — TELEMEDICINE (OUTPATIENT)
Dept: BEHAVIORAL HEALTH | Facility: CLINIC | Age: 39
End: 2024-01-25
Payer: COMMERCIAL

## 2024-01-25 DIAGNOSIS — F41.9 ANXIETY: ICD-10-CM

## 2024-01-25 DIAGNOSIS — F41.9 ANXIETY DISORDER, UNSPECIFIED TYPE: ICD-10-CM

## 2024-01-25 DIAGNOSIS — F41.0 PANIC DISORDER: ICD-10-CM

## 2024-01-25 DIAGNOSIS — F33.42 MAJOR DEPRESSIVE DISORDER, RECURRENT EPISODE, IN FULL REMISSION (CMS-HCC): ICD-10-CM

## 2024-01-25 DIAGNOSIS — F33.0 MILD EPISODE OF RECURRENT MAJOR DEPRESSIVE DISORDER (CMS-HCC): ICD-10-CM

## 2024-01-25 PROCEDURE — 99214 OFFICE O/P EST MOD 30 MIN: CPT | Performed by: CLINICAL NURSE SPECIALIST

## 2024-01-25 RX ORDER — SERTRALINE HYDROCHLORIDE 100 MG/1
200 TABLET, FILM COATED ORAL DAILY
Qty: 180 TABLET | Refills: 0 | Status: SHIPPED | OUTPATIENT
Start: 2024-01-25 | End: 2024-03-14 | Stop reason: SDUPTHER

## 2024-01-25 RX ORDER — BUSPIRONE HYDROCHLORIDE 5 MG/1
5 TABLET ORAL 2 TIMES DAILY
Qty: 180 TABLET | Refills: 0 | Status: SHIPPED | OUTPATIENT
Start: 2024-01-25 | End: 2024-03-14 | Stop reason: SDUPTHER

## 2024-01-25 RX ORDER — BUPROPION HYDROCHLORIDE 150 MG/1
150 TABLET, EXTENDED RELEASE ORAL DAILY
Qty: 90 TABLET | Refills: 0 | Status: SHIPPED | OUTPATIENT
Start: 2024-01-25 | End: 2024-03-14 | Stop reason: SDUPTHER

## 2024-01-25 NOTE — PROGRESS NOTES
Outpatient Psychiatry      Subjective   Freya Sanz, a 38 y.o. female, presents for a routine follow up appointment as an established patient for medication management /outpatient psychiatry      Diagnosis:    · Major depressive disorder, recurrent episode   · Panic disorder      Patient Active Problem List   Diagnosis    Melanocytic nevi of lower extremity or hip    Bilateral myopia    Cafe au lait spots    Cervicalgia    Acute left-sided low back pain without sciatica    Chronic left-sided low back pain with left-sided sciatica    Closed nondisplaced fracture of proximal phalanx of lesser toe of right foot    Current smoker    Hemangioma of skin and subcutaneous tissue    History of vitamin D deficiency    HPV in female    Left lumbar radiculopathy    Major depressive disorder, recurrent episode, in full remission (CMS/HCC)    Melanocytic nevi of trunk    Melanocytic nevi of other parts of face    Myalgia    Nicotine use disorder    Other skin changes due to chronic exposure to nonionizing radiation    Other melanin hyperpigmentation    Postural strain    Right foot injury, initial encounter    Back pain    Pelvic pain in female    Sacral back pain    Sacroiliitis (CMS/HCC)    Segmental and somatic dysfunction    Shortness of breath on exertion    Spondylosis, lumbosacral    Sprain of right ankle, initial encounter    Vitamin D insufficiency    Abnormal uterine bleeding (AUB)    Acne vulgaris    Anxiety and depression    ASCUS with positive high risk HPV cervical    Astigmatism, bilateral    Bertolotti's syndrome    Gastritis, acute    Anxiety    Stress due to family tension    Epigastric abdominal pain    Bloating       Treatment Goals:  Specify outcomes written in observable, behavioral terms:   Maintain stability of mental health and adhere to treatment        Treatment Plan/ recommendations :  4-6 weeks follow up , can continue self care and wellness efforts , can call  for treatment and  scheduling concerns , can maintain routine health screenings      Follow-up plan  was discussed with patient.    Review with patient: Treatment plan reviewed with the patient.  Medication risks/benefit reviewed with the patient    HPI:  patient sees wood mao for therapy , therapy has helped in dealing with stressors     She is initiating fmla intermittent for work due to increased anxiety and depressed mood interfering with daily activities   no altered thoughts   family stressors and she spoke about her thoughts and feelings about this   no concerns about memory issues   no history of emanuel or hypomania   reconciled medication list in the [facility] emr and provided psychoeducation       Current Medications:    Medical History:  Past Medical History:   Diagnosis Date    Major depressive disorder, single episode, in partial remission (CMS/[facility]) 05/15/2017    Depression, major, single episode, in partial remission    Other specified health status     No pertinent past surgical history    Other specified health status     No pertinent past medical history    Post-traumatic stress disorder, unspecified     Post-traumatic stress disorder    Viral upper respiratory tract infection 05/11/2023     Surgical History:  Past Surgical History:   Procedure Laterality Date    MOUTH SURGERY  02/25/2016    Oral Surgery Tooth Extraction    TONSILLECTOMY  02/25/2016    Tonsillectomy     Family History:  No family history on file.  Social History:  Social History     Socioeconomic History    Marital status:      Spouse name: Not on file    Number of children: Not on file    Years of education: Not on file    Highest education level: Not on file   Occupational History    Not on file   Tobacco Use    Smoking status: Every Day     Packs/day: .25     Types: Cigarettes    Smokeless tobacco: Never   Vaping Use    Vaping Use: Never used   Substance and Sexual Activity    Alcohol use: Yes    Drug use: Never    Sexual activity: Defer    Other Topics Concern    Not on file   Social History Narrative    Not on file     Social Determinants of Health     Financial Resource Strain: Not on file   Food Insecurity: Not on file   Transportation Needs: Not on file   Physical Activity: Not on file   Stress: Not on file   Social Connections: Not on file   Intimate Partner Violence: Not on file   Housing Stability: Not on file     Record Review: brief     Vitals:  There were no vitals filed for this visit.    Carol Hodges, APRN-CNS

## 2024-02-01 ENCOUNTER — APPOINTMENT (OUTPATIENT)
Dept: BEHAVIORAL HEALTH | Facility: CLINIC | Age: 39
End: 2024-02-01
Payer: COMMERCIAL

## 2024-02-08 ENCOUNTER — TELEMEDICINE (OUTPATIENT)
Dept: BEHAVIORAL HEALTH | Facility: CLINIC | Age: 39
End: 2024-02-08
Payer: COMMERCIAL

## 2024-02-08 ENCOUNTER — APPOINTMENT (OUTPATIENT)
Dept: DERMATOLOGY | Facility: CLINIC | Age: 39
End: 2024-02-08
Payer: COMMERCIAL

## 2024-02-08 DIAGNOSIS — F41.9 ANXIETY: ICD-10-CM

## 2024-02-08 DIAGNOSIS — F43.21 GRIEVING: ICD-10-CM

## 2024-02-08 PROCEDURE — 90837 PSYTX W PT 60 MINUTES: CPT | Performed by: COUNSELOR

## 2024-02-08 NOTE — PROGRESS NOTES
Start time: 9:05 am  End time: 10:00 am  Total time: 55 minutes  Telehealth visit, pt consented  Diagnosis: F41.8, F43.21  Last visit: 12/28/2023  Provider joined, probed.   Freya returns today reporting doing okay.  She started individual grief counseling with [facility] last week and is learning a lot.  She continues to manage her brothers estate which is stressful.  She is in communication with her mother regarding her brothers estate and is mindful to maintain boundaries.  Her father remains in treatment for substance abuse since the beginning of the year.  She continues to work f/t, denies that work adds stress, supervisor is supportive.  PT has intermittent FMLA through July.   She notes practicing mindfulness, enjoying time with her  time and with her dogs.   Norm has been court ordered to residential treatment at [facility] since January.  Anxiety 3/10  Depression 4/10. We discussed ongoing treatment needs. Agreed to meet after completing 8 weeks in grief counseling.   Reinforced mindful self care.

## 2024-02-09 ENCOUNTER — PHARMACY VISIT (OUTPATIENT)
Dept: PHARMACY | Facility: CLINIC | Age: 39
End: 2024-02-09
Payer: COMMERCIAL

## 2024-02-09 PROCEDURE — RXMED WILLOW AMBULATORY MEDICATION CHARGE

## 2024-02-22 ENCOUNTER — APPOINTMENT (OUTPATIENT)
Dept: BEHAVIORAL HEALTH | Facility: CLINIC | Age: 39
End: 2024-02-22
Payer: COMMERCIAL

## 2024-03-14 ENCOUNTER — TELEMEDICINE (OUTPATIENT)
Dept: BEHAVIORAL HEALTH | Facility: CLINIC | Age: 39
End: 2024-03-14
Payer: COMMERCIAL

## 2024-03-14 DIAGNOSIS — F41.9 ANXIETY: ICD-10-CM

## 2024-03-14 DIAGNOSIS — F41.0 PANIC DISORDER: ICD-10-CM

## 2024-03-14 DIAGNOSIS — F33.0 MILD EPISODE OF RECURRENT MAJOR DEPRESSIVE DISORDER (CMS-HCC): ICD-10-CM

## 2024-03-14 PROCEDURE — 99213 OFFICE O/P EST LOW 20 MIN: CPT | Performed by: CLINICAL NURSE SPECIALIST

## 2024-03-14 PROCEDURE — RXMED WILLOW AMBULATORY MEDICATION CHARGE

## 2024-03-14 RX ORDER — BUPROPION HYDROCHLORIDE 150 MG/1
150 TABLET, EXTENDED RELEASE ORAL DAILY
Qty: 90 TABLET | Refills: 0 | Status: SHIPPED | OUTPATIENT
Start: 2024-03-14 | End: 2024-08-08

## 2024-03-14 RX ORDER — BUSPIRONE HYDROCHLORIDE 5 MG/1
5 TABLET ORAL 2 TIMES DAILY
Qty: 180 TABLET | Refills: 0 | Status: SHIPPED | OUTPATIENT
Start: 2024-03-14 | End: 2024-06-07 | Stop reason: SDUPTHER

## 2024-03-14 RX ORDER — HYDROXYZINE HYDROCHLORIDE 10 MG/1
10 TABLET, FILM COATED ORAL 2 TIMES DAILY PRN
Qty: 60 TABLET | Refills: 0 | Status: SHIPPED | OUTPATIENT
Start: 2024-03-14 | End: 2024-04-14

## 2024-03-14 RX ORDER — SERTRALINE HYDROCHLORIDE 100 MG/1
200 TABLET, FILM COATED ORAL DAILY
Qty: 180 TABLET | Refills: 0 | Status: SHIPPED | OUTPATIENT
Start: 2024-03-14 | End: 2024-06-07 | Stop reason: SDUPTHER

## 2024-03-14 NOTE — PROGRESS NOTES
Outpatient Psychiatry      Subjective     Freya Sanz, a 38 y.o. female, presents for a routine follow up appointment as an established patient for medication management /outpatient psychiatry       Diagnosis:    · mild Major depressive disorder, recurrent episode   · Panic disorder    Patient Active Problem List   Diagnosis    Melanocytic nevi of lower extremity or hip    Bilateral myopia    Cafe au lait spots    Cervicalgia    Acute left-sided low back pain without sciatica    Chronic left-sided low back pain with left-sided sciatica    Closed nondisplaced fracture of proximal phalanx of lesser toe of right foot    Current smoker    Hemangioma of skin and subcutaneous tissue    History of vitamin D deficiency    HPV in female    Left lumbar radiculopathy    Major depressive disorder, recurrent episode, in full remission (CMS/HCC)    Melanocytic nevi of trunk    Melanocytic nevi of other parts of face    Myalgia    Nicotine use disorder    Other skin changes due to chronic exposure to nonionizing radiation    Other melanin hyperpigmentation    Postural strain    Right foot injury, initial encounter    Back pain    Pelvic pain in female    Sacral back pain    Sacroiliitis (CMS/HCC)    Segmental and somatic dysfunction    Shortness of breath on exertion    Spondylosis, lumbosacral    Sprain of right ankle, initial encounter    Vitamin D insufficiency    Abnormal uterine bleeding (AUB)    Acne vulgaris    Anxiety and depression    ASCUS with positive high risk HPV cervical    Astigmatism, bilateral    Bertolotti's syndrome    Gastritis, acute    Anxiety    Stress due to family tension    Epigastric abdominal pain    Bloating     Treatment Plan/Recommendations:   4-6 weeks follow up , can continue self care and wellness efforts , can call  for treatment and scheduling concerns , can maintain routine health screenings      Follow-up plan for depression was discussed with patient.    Review with patient:  Treatment plan reviewed with the patient.  Medication risks/benefit reviewed with the patient    HPI:  patient finds individual grief counseling at [facility] helpful, she will do a group therapy in the summer   She has been able to work   no concerns about memory issues   no history of emanuel or hypomania   reconciled medication list in the [facility] emr and provided psychoeducation      Current Outpatient Medications:     buPROPion SR (Wellbutrin SR) 150 mg 12 hr tablet, Take 1 tablet (150 mg) by mouth once daily., Disp: 90 tablet, Rfl: 0    busPIRone (Buspar) 5 mg tablet, Take 1 tablet (5 mg) by mouth 2 times a day., Disp: 180 tablet, Rfl: 0    cholecalciferol, vitamin D3, 50 mcg (2,000 unit) tablet,chewable, Chew., Disp: , Rfl:     clindamycin (Clindagel) 1 % gel, , Disp: , Rfl:     hydrOXYzine HCL (Atarax) 10 mg tablet, Take by mouth., Disp: , Rfl:     ibuprofen 800 mg tablet, Take 1 tablet (800 mg) by mouth 3 times a day as needed., Disp: , Rfl:     multivitamin-Ca-iron-minerals (Tab-A-Albert Womens) 27-0.4 mg tablet, , Disp: , Rfl:     norethindrone (Aygestin) 5 mg tablet, Take 1 tablet (5 mg) by mouth once daily., Disp: 90 tablet, Rfl: 3    omeprazole (PriLOSEC) 40 mg DR capsule, Take 1 capsule (40 mg) by mouth if needed (if symptomatic). Do not crush or chew., Disp: 30 capsule, Rfl: 2    sertraline (Zoloft) 100 mg tablet, Take 2 tablets (200 mg) by mouth once daily., Disp: 180 tablet, Rfl: 0    Current Facility-Administered Medications:     omeprazole (PriLOSEC) DR capsule 20 mg, 20 mg, oral, Once, Sheridan Trevizo, APRN-CNP  Medical History:  Past Medical History:   Diagnosis Date    Major depressive disorder, single episode, in partial remission (CMS/[facility]) 05/15/2017    Depression, major, single episode, in partial remission    Other specified health status     No pertinent past surgical history    Other specified health status     No pertinent past medical history    Post-traumatic stress disorder,  unspecified     Post-traumatic stress disorder    Viral upper respiratory tract infection 05/11/2023     Surgical History:  Past Surgical History:   Procedure Laterality Date    MOUTH SURGERY  02/25/2016    Oral Surgery Tooth Extraction    TONSILLECTOMY  02/25/2016    Tonsillectomy     Family History:  No family history on file.  Social History:  Social History     Socioeconomic History    Marital status:      Spouse name: Not on file    Number of children: Not on file    Years of education: Not on file    Highest education level: Not on file   Occupational History    Not on file   Tobacco Use    Smoking status: Every Day     Packs/day: .25     Types: Cigarettes    Smokeless tobacco: Never   Vaping Use    Vaping Use: Never used   Substance and Sexual Activity    Alcohol use: Yes    Drug use: Never    Sexual activity: Defer   Other Topics Concern    Not on file   Social History Narrative    Not on file     Social Determinants of Health     Financial Resource Strain: Not on file   Food Insecurity: Not on file   Transportation Needs: Not on file   Physical Activity: Not on file   Stress: Not on file   Social Connections: Not on file   Intimate Partner Violence: Not on file   Housing Stability: Not on file     Record Review: brief    Vitals:  There were no vitals filed for this visit.    Carol Hodges, APRN-CNS

## 2024-03-15 ENCOUNTER — PHARMACY VISIT (OUTPATIENT)
Dept: PHARMACY | Facility: CLINIC | Age: 39
End: 2024-03-15
Payer: COMMERCIAL

## 2024-04-10 PROCEDURE — RXMED WILLOW AMBULATORY MEDICATION CHARGE

## 2024-04-11 ENCOUNTER — OFFICE VISIT (OUTPATIENT)
Dept: PRIMARY CARE | Facility: CLINIC | Age: 39
End: 2024-04-11
Payer: COMMERCIAL

## 2024-04-11 VITALS
SYSTOLIC BLOOD PRESSURE: 116 MMHG | HEART RATE: 96 BPM | DIASTOLIC BLOOD PRESSURE: 68 MMHG | BODY MASS INDEX: 31.08 KG/M2 | WEIGHT: 167.2 LBS | TEMPERATURE: 96.9 F | OXYGEN SATURATION: 97 %

## 2024-04-11 DIAGNOSIS — F43.21 GRIEF: ICD-10-CM

## 2024-04-11 DIAGNOSIS — K21.9 GASTROESOPHAGEAL REFLUX DISEASE WITHOUT ESOPHAGITIS: ICD-10-CM

## 2024-04-11 DIAGNOSIS — Z81.1 FAMILY HISTORY OF ALCOHOLISM: ICD-10-CM

## 2024-04-11 DIAGNOSIS — Z63.8 STRESS DUE TO FAMILY TENSION: ICD-10-CM

## 2024-04-11 DIAGNOSIS — F10.10 ALCOHOL ABUSE: Primary | ICD-10-CM

## 2024-04-11 DIAGNOSIS — K29.00 OTHER ACUTE GASTRITIS WITHOUT HEMORRHAGE: ICD-10-CM

## 2024-04-11 DIAGNOSIS — F41.9 ANXIETY: ICD-10-CM

## 2024-04-11 PROCEDURE — RXMED WILLOW AMBULATORY MEDICATION CHARGE

## 2024-04-11 PROCEDURE — 99214 OFFICE O/P EST MOD 30 MIN: CPT | Performed by: NURSE PRACTITIONER

## 2024-04-11 RX ORDER — PANTOPRAZOLE SODIUM 40 MG/1
40 TABLET, DELAYED RELEASE ORAL 2 TIMES DAILY
Qty: 60 TABLET | Refills: 1 | Status: SHIPPED | OUTPATIENT
Start: 2024-04-11 | End: 2024-06-04 | Stop reason: SDUPTHER

## 2024-04-11 SDOH — SOCIAL STABILITY - SOCIAL INSECURITY: OTHER SPECIFIED PROBLEMS RELATED TO PRIMARY SUPPORT GROUP: Z63.8

## 2024-04-11 ASSESSMENT — PATIENT HEALTH QUESTIONNAIRE - PHQ9
2. FEELING DOWN, DEPRESSED OR HOPELESS: NOT AT ALL
SUM OF ALL RESPONSES TO PHQ9 QUESTIONS 1 AND 2: 0
1. LITTLE INTEREST OR PLEASURE IN DOING THINGS: NOT AT ALL

## 2024-04-11 ASSESSMENT — ENCOUNTER SYMPTOMS: DEPRESSION: 0

## 2024-04-11 ASSESSMENT — PAIN SCALES - GENERAL: PAINLEVEL: 0-NO PAIN
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
## 2024-04-11 NOTE — PATIENT INSTRUCTIONS
A prescription was sent to your pharmacy. Your pharmacist can answer any questions or concerns you may have or you may call the office.    GERD or acid reflux occurs when stomach contents back up into the esophagus.Symptoms can include heartburn, chest pain, pain with swallowing, stomach pain, nausea/vomiting, and a sense of a lump in the throat.   Treatment can include medication and/or lifestyle changes such as;   Losing weight if you are overweight  Avoiding foods that trigger symptoms such as caffeine, chocolate, alcohol, citrus, fatty foods, spicy foods, chocolate  Quitting smoking, saliva helps neutralize refluxed acid but  smoking reduces the amount of saliva in the mouth and throat.It also causes coughing which aggravates reflux.  Avoid late meals  Avoid tight clothing around the abdomen  Raise the head of your bed 6 to 8 inches  Avoid laying down 2 hours from mealtime    Grief is universal. At some point in everyone's life, there will be at least one encounter with grief.   It may be from the death of a loved one, the loss of a job, the end of a relationship, or any other change that alters life as you know it.    Grief is also very personal. It's not very neat or linear. It doesn't follow any timelines or schedules.  You may cry, become angry, withdraw, feel empty.   None of these things are unusual or wrong.   Everyone grieves differently, but there are some commonalities in the stages and the order of feelings experienced during grief.    The five stages of grief are:  denial  anger  bargaining  depression  acceptance    Not everyone will experience all five stages, and you may not go through them in this order.      Stage 1: Denial  Grief is an overwhelming emotion. It's not unusual to respond to the intense and often sudden feelings by pretending the loss or change isn't happening. Denying it gives you time to more gradually absorb the news and begin to process it. This is a common defense mechanism  "and helps numb you to the intensity of the situation.    As you move out of the denial stage, however, the emotions you've been hiding will begin to rise. You'll be confronted with a lot of sorrow you've denied. That is also part of the journey of grief, but it can be difficult.    Stage 2: Anger  Where denial may be considered a coping mechanism, anger is a masking effect. Anger is hiding many of the emotions and pain that you carry. This anger may be redirected at other people, such as the person who , your ex, or your old boss. You may even aim your anger at inanimate objects.    While your rational brain knows the object of your anger isn't to blame, your feelings in that moment are too intense to feel that.    Anger may mask itself in feelings like bitterness or resentment. It may not be clear-cut fury or rage. Not everyone will experience this stage, and some may linger here. As the anger subsides, however, you may begin to think more rationally about what's happening and feel the emotions you've been pushing aside.    Stage 3: Bargaining  During grief, you may feel vulnerable and helpless. In those moments of intense emotions, it's not uncommon to look for ways to regain control or to want to feel like you can affect the outcome of an event. In the bargaining stage of grief, you may find yourself creating a lot of \"what if\" and \"if only\" statements.    It's also not uncommon for Gnosticism individuals to try to make a deal or promise to God or a higher power in return for healing or relief from the grief and pain. Bargaining is a line of defense against the emotions of grief. It helps you postpone the sadness, confusion, or hurt.    Stage 4: Depression  Whereas anger and bargaining can feel very \"active,\" depression may feel like a \"quiet\" stage of grief.    In the early stages of loss, you may be running from the emotions, trying to stay a step ahead of them. By this point, however, you may be able to " "embrace and work through them in a more healthful manner. You may also choose to isolate yourself from others in order to fully cope with the loss.    That doesn't mean, however, that depression is easy or well defined. Like the other stages of grief, depression can be difficult and messy. It can feel overwhelming. You may feel foggy, heavy, and confused.    Depression may feel like the inevitable landing point of any loss. However, if you feel stuck here or can't seem to move past this stage of grief, talk with a mental health expert. A therapist can help you work through this period of coping.    Stage 5: Acceptance  Acceptance is not necessarily a happy or uplifting stage of grief. It doesn't mean you've moved past the grief or loss. It does, however, mean that you've accepted it and have come to understand what it means in your life now.    You may feel very different in this stage. That's entirely expected. You've had a major change in your life, and that upends the way you feel about many things. Look to acceptance as a way to see that there may be more good days than bad, but there may still be bad - and that's OK    Where did the stages of grief come from?  In 1969, a [place]-American psychiatrist named Julianne Yeung wrote in her book \"On Death and Dying\" that grief could be divided into five stages. Her observations came from years of working with terminally ill individuals.  Her theory of grief became known as the Berlin-Arnol model. While it was originally devised for people who were ill, these stages of grief have been adapted for other experiences with loss, too.    "

## 2024-04-12 ENCOUNTER — PHARMACY VISIT (OUTPATIENT)
Dept: PHARMACY | Facility: CLINIC | Age: 39
End: 2024-04-12
Payer: COMMERCIAL

## 2024-05-09 PROCEDURE — RXMED WILLOW AMBULATORY MEDICATION CHARGE

## 2024-05-10 ENCOUNTER — PHARMACY VISIT (OUTPATIENT)
Dept: PHARMACY | Facility: CLINIC | Age: 39
End: 2024-05-10
Payer: COMMERCIAL

## 2024-06-04 ENCOUNTER — TELEPHONE (OUTPATIENT)
Dept: BEHAVIORAL HEALTH | Facility: CLINIC | Age: 39
End: 2024-06-04
Payer: COMMERCIAL

## 2024-06-04 DIAGNOSIS — F41.9 ANXIETY: ICD-10-CM

## 2024-06-04 DIAGNOSIS — K21.9 GASTROESOPHAGEAL REFLUX DISEASE WITHOUT ESOPHAGITIS: ICD-10-CM

## 2024-06-04 PROCEDURE — RXMED WILLOW AMBULATORY MEDICATION CHARGE

## 2024-06-04 RX ORDER — BUSPIRONE HYDROCHLORIDE 5 MG/1
5 TABLET ORAL 2 TIMES DAILY
Qty: 180 TABLET | Refills: 0 | OUTPATIENT
Start: 2024-06-04 | End: 2024-09-02

## 2024-06-04 RX ORDER — SERTRALINE HYDROCHLORIDE 100 MG/1
200 TABLET, FILM COATED ORAL DAILY
Qty: 180 TABLET | Refills: 0 | OUTPATIENT
Start: 2024-06-04 | End: 2024-09-02

## 2024-06-04 RX ORDER — PANTOPRAZOLE SODIUM 40 MG/1
40 TABLET, DELAYED RELEASE ORAL 2 TIMES DAILY
Qty: 60 TABLET | Refills: 1 | Status: SHIPPED | OUTPATIENT
Start: 2024-06-04 | End: 2024-08-03

## 2024-06-07 ENCOUNTER — PHARMACY VISIT (OUTPATIENT)
Dept: PHARMACY | Facility: CLINIC | Age: 39
End: 2024-06-07
Payer: COMMERCIAL

## 2024-06-07 DIAGNOSIS — F41.9 ANXIETY: ICD-10-CM

## 2024-06-07 PROCEDURE — RXMED WILLOW AMBULATORY MEDICATION CHARGE

## 2024-06-07 RX ORDER — SERTRALINE HYDROCHLORIDE 100 MG/1
200 TABLET, FILM COATED ORAL DAILY
Qty: 180 TABLET | Refills: 0 | Status: SHIPPED | OUTPATIENT
Start: 2024-06-07 | End: 2024-09-05

## 2024-06-07 RX ORDER — BUSPIRONE HYDROCHLORIDE 5 MG/1
5 TABLET ORAL 2 TIMES DAILY
Qty: 180 TABLET | Refills: 0 | Status: SHIPPED | OUTPATIENT
Start: 2024-06-07 | End: 2024-09-05

## 2024-07-09 PROCEDURE — RXMED WILLOW AMBULATORY MEDICATION CHARGE

## 2024-07-11 ENCOUNTER — APPOINTMENT (OUTPATIENT)
Dept: PRIMARY CARE | Facility: CLINIC | Age: 39
End: 2024-07-11
Payer: COMMERCIAL

## 2024-07-11 ENCOUNTER — APPOINTMENT (OUTPATIENT)
Dept: BEHAVIORAL HEALTH | Facility: CLINIC | Age: 39
End: 2024-07-11
Payer: COMMERCIAL

## 2024-07-11 DIAGNOSIS — F41.9 ANXIETY DISORDER, UNSPECIFIED TYPE: ICD-10-CM

## 2024-07-11 DIAGNOSIS — F33.0 MILD EPISODE OF RECURRENT MAJOR DEPRESSIVE DISORDER (CMS-HCC): ICD-10-CM

## 2024-07-11 DIAGNOSIS — F41.9 ANXIETY: ICD-10-CM

## 2024-07-11 PROCEDURE — 99214 OFFICE O/P EST MOD 30 MIN: CPT | Performed by: CLINICAL NURSE SPECIALIST

## 2024-07-11 PROCEDURE — RXMED WILLOW AMBULATORY MEDICATION CHARGE

## 2024-07-11 RX ORDER — BUPROPION HYDROCHLORIDE 150 MG/1
150 TABLET, EXTENDED RELEASE ORAL DAILY
Qty: 90 TABLET | Refills: 0 | Status: SHIPPED | OUTPATIENT
Start: 2024-07-11 | End: 2024-10-15

## 2024-07-11 RX ORDER — SERTRALINE HYDROCHLORIDE 100 MG/1
200 TABLET, FILM COATED ORAL DAILY
Qty: 180 TABLET | Refills: 0 | Status: SHIPPED | OUTPATIENT
Start: 2024-07-11 | End: 2024-10-09

## 2024-07-11 RX ORDER — BUSPIRONE HYDROCHLORIDE 5 MG/1
5 TABLET ORAL 2 TIMES DAILY
Qty: 180 TABLET | Refills: 0 | Status: SHIPPED | OUTPATIENT
Start: 2024-07-11 | End: 2024-10-09

## 2024-07-11 NOTE — PROGRESS NOTES
Outpatient Psychiatry      Subjective     Freya Sanz, a 38 y.o. female,presents for a routine follow up appointment as an established patient for medication management /outpatient psychiatry for an audio visual appointment , she was at home for this appointment and consented to the appointment      Diagnosis:    · mild Major depressive disorder, recurrent episode   · Panic disorder mild   Anxiety disorder unspecified type mild        Patient Active Problem List   Diagnosis    Melanocytic nevi of lower extremity or hip    Bilateral myopia    Cafe au lait spots    Cervicalgia    Acute left-sided low back pain without sciatica    Chronic left-sided low back pain with left-sided sciatica    Closed nondisplaced fracture of proximal phalanx of lesser toe of right foot    Current smoker    Hemangioma of skin and subcutaneous tissue    History of vitamin D deficiency    HPV in female    Left lumbar radiculopathy    Major depressive disorder, recurrent episode, in full remission (CMS-HCC)    Melanocytic nevi of trunk    Melanocytic nevi of other parts of face    Myalgia    Nicotine use disorder    Other skin changes due to chronic exposure to nonionizing radiation    Other melanin hyperpigmentation    Postural strain    Right foot injury, initial encounter    Back pain    Pelvic pain in female    Sacral back pain    Sacroiliitis (CMS-HCC)    Segmental and somatic dysfunction    Shortness of breath on exertion    Spondylosis, lumbosacral    Sprain of right ankle, initial encounter    Vitamin D insufficiency    Abnormal uterine bleeding (AUB)    Acne vulgaris    Anxiety and depression    ASCUS with positive high risk HPV cervical    Astigmatism, bilateral    Bertolotti's syndrome    Gastritis, acute    Anxiety    Stress due to family tension    Epigastric abdominal pain    Bloating    Grief     Treatment Plan/Recommendations:   Follow-up plan was discussed with patient.  8 weeks follow up , can continue self care and  wellness efforts , can call  for treatment and scheduling concerns , can maintain routine health screenings   Psychotropic medications :   Continue   Bupropion sr 150 mg , daily for depression   Buspar 5 mg twice a day for anxiety   Sertraline 100 mg 2 tablets daily for depression and anxiety  Hydroxyzine 10 mg , twice a day as needed for anxiety       Review with patient: Treatment plan reviewed with the patient.  Medication risks/benefit reviewed with the patient    HPI:She has initiated the process for fmla   Has dealt with anxiety , feels nervous and worried at times   Sleep varies in quality   She is interested in starting individual therapy   She spoke about family stressors , support provided   no concerns about memory issues   no history of emanuel or hypomania   reconciled medication list in the [facility] emr and provided psychoeducation     Psych ros and medical ros : as noted in hpi section of this note      Current Outpatient Medications:     buPROPion SR (Wellbutrin SR) 150 mg 12 hr tablet, Take 1 tablet (150 mg) by mouth once daily., Disp: 90 tablet, Rfl: 0    busPIRone (Buspar) 5 mg tablet, Take 1 tablet (5 mg) by mouth 2 times a day., Disp: 180 tablet, Rfl: 0    cholecalciferol, vitamin D3, 50 mcg (2,000 unit) tablet,chewable, Chew., Disp: , Rfl:     clindamycin (Clindagel) 1 % gel, , Disp: , Rfl:     hydrOXYzine HCL (Atarax) 10 mg tablet, Take 1 tablet (10 mg) by mouth 2 times a day as needed for anxiety., Disp: 60 tablet, Rfl: 0    ibuprofen 800 mg tablet, Take 1 tablet (800 mg) by mouth 3 times a day as needed., Disp: , Rfl:     multivitamin-Ca-iron-minerals (Tab-A-Albert Womens) 27-0.4 mg tablet, , Disp: , Rfl:     norethindrone (Aygestin) 5 mg tablet, Take 1 tablet (5 mg) by mouth once daily., Disp: 90 tablet, Rfl: 3    omeprazole (PriLOSEC) 40 mg DR capsule, Take 1 capsule (40 mg) by mouth if needed (if symptomatic). Do not crush or chew., Disp: 30 capsule, Rfl: 2    pantoprazole (ProtoNix)  40 mg EC tablet, Take 1 tablet (40 mg) by mouth 2 times a day. Do not crush, chew, or split., Disp: 60 tablet, Rfl: 1    sertraline (Zoloft) 100 mg tablet, Take 2 tablets (200 mg) by mouth once daily., Disp: 180 tablet, Rfl: 0    Current Facility-Administered Medications:     omeprazole (PriLOSEC) DR capsule 20 mg, 20 mg, oral, Once, Sheridan Trevizo, APRN-CNP  Medical History:  Past Medical History:   Diagnosis Date    Major depressive disorder, single episode, in partial remission (CMS-[facility]) 05/15/2017    Depression, major, single episode, in partial remission    Other specified health status     No pertinent past surgical history    Other specified health status     No pertinent past medical history    Post-traumatic stress disorder, unspecified     Post-traumatic stress disorder    Viral upper respiratory tract infection 05/11/2023     Surgical History:  Past Surgical History:   Procedure Laterality Date    MOUTH SURGERY  02/25/2016    Oral Surgery Tooth Extraction    TONSILLECTOMY  02/25/2016    Tonsillectomy     Family History:  No family history on file.  Social History:  Social History     Socioeconomic History    Marital status:      Spouse name: Not on file    Number of children: Not on file    Years of education: Not on file    Highest education level: Not on file   Occupational History    Not on file   Tobacco Use    Smoking status: Every Day     Current packs/day: 0.25     Types: Cigarettes    Smokeless tobacco: Never   Vaping Use    Vaping status: Never Used   Substance and Sexual Activity    Alcohol use: Yes    Drug use: Never    Sexual activity: Defer   Other Topics Concern    Not on file   Social History Narrative    Not on file     Social Determinants of Health     Financial Resource Strain: Not on file   Food Insecurity: Not on file   Transportation Needs: Not on file   Physical Activity: Not on file   Stress: Not on file   Social Connections: Not on file   Intimate Partner Violence: Not on  file   Housing Stability: Not on file     Record Review: brief     Vitals:  There were no vitals filed for this visit.    Carol Hodges, APRN-CNS

## 2024-07-17 ENCOUNTER — PHARMACY VISIT (OUTPATIENT)
Dept: PHARMACY | Facility: CLINIC | Age: 39
End: 2024-07-17
Payer: COMMERCIAL

## 2024-08-13 DIAGNOSIS — N93.9 ABNORMAL UTERINE BLEEDING (AUB): ICD-10-CM

## 2024-08-13 RX ORDER — NORETHINDRONE 5 MG/1
5 TABLET ORAL DAILY
Qty: 90 TABLET | Refills: 3 | Status: SHIPPED | OUTPATIENT
Start: 2024-08-13

## 2024-08-15 ENCOUNTER — APPOINTMENT (OUTPATIENT)
Dept: PRIMARY CARE | Facility: CLINIC | Age: 39
End: 2024-08-15
Payer: COMMERCIAL

## 2024-08-15 VITALS
DIASTOLIC BLOOD PRESSURE: 68 MMHG | BODY MASS INDEX: 33.16 KG/M2 | OXYGEN SATURATION: 97 % | HEART RATE: 103 BPM | SYSTOLIC BLOOD PRESSURE: 120 MMHG | TEMPERATURE: 97.1 F | WEIGHT: 178.4 LBS

## 2024-08-15 DIAGNOSIS — F17.200 CURRENT SMOKER: ICD-10-CM

## 2024-08-15 DIAGNOSIS — E55.9 VITAMIN D INSUFFICIENCY: ICD-10-CM

## 2024-08-15 DIAGNOSIS — Z00.00 ANNUAL PHYSICAL EXAM: Primary | ICD-10-CM

## 2024-08-15 DIAGNOSIS — M54.42 CHRONIC LEFT-SIDED LOW BACK PAIN WITH LEFT-SIDED SCIATICA: ICD-10-CM

## 2024-08-15 DIAGNOSIS — F32.A ANXIETY AND DEPRESSION: ICD-10-CM

## 2024-08-15 DIAGNOSIS — Z12.31 BREAST CANCER SCREENING BY MAMMOGRAM: ICD-10-CM

## 2024-08-15 DIAGNOSIS — G89.29 CHRONIC LEFT-SIDED LOW BACK PAIN WITH LEFT-SIDED SCIATICA: ICD-10-CM

## 2024-08-15 DIAGNOSIS — F41.9 ANXIETY AND DEPRESSION: ICD-10-CM

## 2024-08-15 PROCEDURE — 99396 PREV VISIT EST AGE 40-64: CPT | Performed by: NURSE PRACTITIONER

## 2024-08-15 ASSESSMENT — PAIN SCALES - GENERAL: PAINLEVEL: 0-NO PAIN

## 2024-08-15 ASSESSMENT — ENCOUNTER SYMPTOMS: DEPRESSION: 0

## 2024-08-15 NOTE — ASSESSMENT & PLAN NOTE
Follows with Psych NP JOSE Irizarry  In the process of finding a new counselor  Has been following with [facility] grief program
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
## 2024-08-29 ENCOUNTER — APPOINTMENT (OUTPATIENT)
Dept: BEHAVIORAL HEALTH | Facility: CLINIC | Age: 39
End: 2024-08-29
Payer: COMMERCIAL

## 2024-08-29 DIAGNOSIS — F32.A ANXIETY AND DEPRESSION: ICD-10-CM

## 2024-08-29 DIAGNOSIS — F41.9 ANXIETY AND DEPRESSION: ICD-10-CM

## 2024-08-29 PROCEDURE — 90837 PSYTX W PT 60 MINUTES: CPT | Performed by: SOCIAL WORKER
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
## 2024-10-03 ENCOUNTER — SOCIAL WORK (OUTPATIENT)
Dept: BEHAVIORAL HEALTH | Facility: CLINIC | Age: 39
End: 2024-10-03
Payer: COMMERCIAL

## 2024-10-03 ENCOUNTER — APPOINTMENT (OUTPATIENT)
Dept: BEHAVIORAL HEALTH | Facility: CLINIC | Age: 39
End: 2024-10-03
Payer: COMMERCIAL

## 2024-10-03 DIAGNOSIS — F41.0 PANIC DISORDER: ICD-10-CM

## 2024-10-03 DIAGNOSIS — F32.A ANXIETY AND DEPRESSION: ICD-10-CM

## 2024-10-03 DIAGNOSIS — F33.0 MILD EPISODE OF RECURRENT MAJOR DEPRESSIVE DISORDER (CMS-HCC): ICD-10-CM

## 2024-10-03 DIAGNOSIS — K21.9 GASTROESOPHAGEAL REFLUX DISEASE WITHOUT ESOPHAGITIS: ICD-10-CM

## 2024-10-03 DIAGNOSIS — F41.9 ANXIETY DISORDER, UNSPECIFIED TYPE: ICD-10-CM

## 2024-10-03 DIAGNOSIS — F41.9 ANXIETY AND DEPRESSION: ICD-10-CM

## 2024-10-03 PROCEDURE — RXMED WILLOW AMBULATORY MEDICATION CHARGE

## 2024-10-03 PROCEDURE — 99212 OFFICE O/P EST SF 10 MIN: CPT | Performed by: CLINICAL NURSE SPECIALIST

## 2024-10-03 PROCEDURE — 90837 PSYTX W PT 60 MINUTES: CPT | Performed by: SOCIAL WORKER

## 2024-10-03 RX ORDER — BUPROPION HYDROCHLORIDE 150 MG/1
150 TABLET, EXTENDED RELEASE ORAL DAILY
Qty: 90 TABLET | Refills: 0 | OUTPATIENT
Start: 2024-10-03 | End: 2025-01-01

## 2024-10-03 RX ORDER — PANTOPRAZOLE SODIUM 40 MG/1
40 TABLET, DELAYED RELEASE ORAL 2 TIMES DAILY
Qty: 60 TABLET | Refills: 2 | Status: SHIPPED | OUTPATIENT
Start: 2024-10-03 | End: 2025-01-01
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
## 2024-10-03 NOTE — PROGRESS NOTES
Outpatient Psychiatry      Subjective   Freya Sanz, a 39 y.o. female presents for a routine follow up appointment as an established patient for medication management /outpatient psychiatry for a virtual appointment , she was at home for this appointment   Virtual or Telephone Consent    An interactive audio and video telecommunication system which permits real time communications between the patient (at the originating site) and provider (at the distant site) was utilized to provide this telehealth service.   Verbal consent was requested and obtained from Freya Sanz on this date, 10/11/24 for a telehealth visit.       Diagnosis:    · mild Major depressive disorder, recurrent episode F33.0   · Panic disorder mild F41.0  Anxiety disorder unspecified type mild F41.9        Problem List       Patient Active Problem List   Diagnosis    Melanocytic nevi of lower extremity or hip    Bilateral myopia    Cafe au lait spots    Cervicalgia    Acute left-sided low back pain without sciatica    Chronic left-sided low back pain with left-sided sciatica    Closed nondisplaced fracture of proximal phalanx of lesser toe of right foot    Current smoker    Hemangioma of skin and subcutaneous tissue    History of vitamin D deficiency    HPV in female    Left lumbar radiculopathy    Major depressive disorder, recurrent episode, in full remission (CMS-HCC)    Melanocytic nevi of trunk    Melanocytic nevi of other parts of face    Myalgia    Nicotine use disorder    Other skin changes due to chronic exposure to nonionizing radiation    Other melanin hyperpigmentation    Postural strain    Right foot injury, initial encounter    Back pain    Pelvic pain in female    Sacral back pain    Sacroiliitis (CMS-HCC)    Segmental and somatic dysfunction    Shortness of breath on exertion    Spondylosis, lumbosacral    Sprain of right ankle, initial encounter    Vitamin D insufficiency    Abnormal uterine bleeding (AUB)    Acne vulgaris     Anxiety and depression    ASCUS with positive high risk HPV cervical    Astigmatism, bilateral    Bertolotti's syndrome    Gastritis, acute    Anxiety    Stress due to family tension    Epigastric abdominal pain    Bloating    Grief         Treatment Plan/Recommendations:   Follow-up plan was discussed with patient.  8 weeks follow up , can continue self care and wellness efforts , can call  for treatment and scheduling concerns , can maintain routine health screenings   Psychotropic medications :   Continue   Bupropion sr 150 mg , daily for depression   Buspar 5 mg twice a day for anxiety   Sertraline 100 mg 2 tablets daily for depression and anxiety  Discontinue Hydroxyzine 10 mg , twice a day as needed for anxiety ( not needed , has not taken in a while per patient report )         Review with patient: Treatment plan reviewed with the patient.  Medication risks/benefit reviewed with the patient     HPI:She has initiated the process for fmla   Has dealt with anxiety , feels nervous and worried at times   Sleep varies in quality   She is interested in starting individual therapy   She spoke about family stressors , support provided   no concerns about memory issues   no history of emanuel or hypomania   reconciled medication list in the [facility] emr and provided psychoeducation      Psych ros and medical ros : as noted in hpi section of this note           Patient Active Problem List   Diagnosis    Melanocytic nevi of lower extremity or hip    Bilateral myopia    Cafe au lait spots    Cervicalgia    Acute left-sided low back pain without sciatica    Chronic left-sided low back pain with left-sided sciatica    Closed nondisplaced fracture of proximal phalanx of lesser toe of right foot    Current smoker    Hemangioma of skin and subcutaneous tissue    History of vitamin D deficiency    HPV in female    Left lumbar radiculopathy    Major depressive disorder, recurrent episode, in full remission (CMS-[facility])     Melanocytic nevi of trunk    Melanocytic nevi of other parts of face    Myalgia    Nicotine use disorder    Other skin changes due to chronic exposure to nonionizing radiation    Other melanin hyperpigmentation    Postural strain    Right foot injury, initial encounter    Back pain    Pelvic pain in female    Sacral back pain    Sacroiliitis (CMS-HCC)    Segmental and somatic dysfunction    Shortness of breath on exertion    Spondylosis, lumbosacral    Sprain of right ankle, initial encounter    Vitamin D insufficiency    Abnormal uterine bleeding (AUB)    Acne vulgaris    Anxiety and depression    ASCUS with positive high risk HPV cervical    Astigmatism, bilateral    Bertolotti's syndrome    Gastritis, acute    Anxiety    Stress due to family tension    Epigastric abdominal pain    Bloating    Grief    Annual physical exam    Breast cancer screening by mammogram     Current Outpatient Medications:     buPROPion SR (Wellbutrin SR) 150 mg 12 hr tablet, Take 1 tablet (150 mg) by mouth once daily., Disp: 90 tablet, Rfl: 0    busPIRone (Buspar) 5 mg tablet, Take 1 tablet (5 mg) by mouth 2 times a day., Disp: 180 tablet, Rfl: 0    cholecalciferol, vitamin D3, 50 mcg (2,000 unit) tablet,chewable, Chew., Disp: , Rfl:     hydrOXYzine HCL (Atarax) 10 mg tablet, Take 1 tablet (10 mg) by mouth 2 times a day as needed for anxiety., Disp: 60 tablet, Rfl: 0    ibuprofen 800 mg tablet, Take 1 tablet (800 mg) by mouth 3 times a day as needed., Disp: , Rfl:     multivitamin-Ca-iron-minerals (Tab-A-Albert Womens) 27-0.4 mg tablet, , Disp: , Rfl:     norethindrone (Aygestin) 5 mg tablet, Take 1 tablet (5 mg) by mouth once daily., Disp: 90 tablet, Rfl: 3    pantoprazole (ProtoNix) 40 mg EC tablet, Take 1 tablet (40 mg) by mouth 2 times a day. Do not crush, chew, or split., Disp: 60 tablet, Rfl: 1    sertraline (Zoloft) 100 mg tablet, Take 2 tablets (200 mg) by mouth once daily., Disp: 180 tablet, Rfl: 0    Current Facility-Administered  Medications:     omeprazole (PriLOSEC) DR capsule 20 mg, 20 mg, oral, Once, Sheridan Trevizo, APRN-CNP  Medical History:  Past Medical History:   Diagnosis Date    Major depressive disorder, single episode, in partial remission (CMS-[facility]) 05/15/2017    Depression, major, single episode, in partial remission    Other specified health status     No pertinent past surgical history    Other specified health status     No pertinent past medical history    Post-traumatic stress disorder, unspecified     Post-traumatic stress disorder    Viral upper respiratory tract infection 05/11/2023     Surgical History:  Past Surgical History:   Procedure Laterality Date    MOUTH SURGERY  02/25/2016    Oral Surgery Tooth Extraction    TONSILLECTOMY  02/25/2016    Tonsillectomy     Family History:  No family history on file.  Social History:  Social History     Socioeconomic History    Marital status:      Spouse name: Not on file    Number of children: Not on file    Years of education: Not on file    Highest education level: Not on file   Occupational History    Not on file   Tobacco Use    Smoking status: Every Day     Current packs/day: 0.25     Types: Cigarettes    Smokeless tobacco: Never   Vaping Use    Vaping status: Never Used   Substance and Sexual Activity    Alcohol use: Yes    Drug use: Never    Sexual activity: Defer   Other Topics Concern    Not on file   Social History Narrative    Not on file     Social Determinants of Health     Financial Resource Strain: Not on file   Food Insecurity: Not on file   Transportation Needs: Not on file   Physical Activity: Not on file   Stress: Not on file   Social Connections: Not on file   Intimate Partner Violence: Not on file   Housing Stability: Not on file     Vitals:  There were no vitals filed for this visit.    Carol Hodges, APRN-CNS

## 2024-10-04 ENCOUNTER — PHARMACY VISIT (OUTPATIENT)
Dept: PHARMACY | Facility: CLINIC | Age: 39
End: 2024-10-04
Payer: COMMERCIAL

## 2024-10-10 ENCOUNTER — APPOINTMENT (OUTPATIENT)
Dept: BEHAVIORAL HEALTH | Facility: CLINIC | Age: 39
End: 2024-10-10
Payer: COMMERCIAL

## 2024-10-10 DIAGNOSIS — F41.9 ANXIETY AND DEPRESSION: ICD-10-CM

## 2024-10-10 DIAGNOSIS — F32.A ANXIETY AND DEPRESSION: ICD-10-CM

## 2024-10-10 PROCEDURE — 90837 PSYTX W PT 60 MINUTES: CPT | Performed by: SOCIAL WORKER

## 2024-10-10 NOTE — PROGRESS NOTES
Televideo Informed Consent for psychological evaluation was reviewed with the patient as follows:     There are potential benefits and risks of the use of telephone or video-conferencing that differ from in-person sessions. Specifically, the telephone or televideo system we are using may not be HIPPA compliant and may present limits to patient confidentiality. Confidentiality still applies for telepsychology services, and nobody will record the session without your permission.    Understanding and verbal agreement was attested to by the patient. Patient identity was confirmed using 3 sources, including telephone number, email address and date of birth. Provision of services via telehealth was necessitated by the restrictions on face-to-face visits accompanying the COVID-19 pandemic.     SECURE NOTE:  This document may not be released or reproduced in any form without the consent of either the Provider, the Provider´s  or the Chair of the Department of Psychiatry. This prohibition includes copying the document into any non-Restricted area of the Ambulatory Electronic Medical Record.      Start time : 9:01 am  End time : 9:55 am    Diagnoses : MDD, NICOLE    Clients current issues: Client wants more space in her own home. She feels uncomfortable around her stepson.     Treatment interventions: Discussed clients issues with her sharona. She doesn't feel supported by her . He doesn't effectively parent Norm. Client doesn't feel safe in her own home. Explored the option of them living separately.   Discussed doing a home contract. Discussed using MST.   Explored options to improve her situation. ( living separately? )     Prognosis: good    Treatment plan update: Client is invested in therapy.      Continue weekly ongoing psychotherapy.

## 2024-10-17 ENCOUNTER — APPOINTMENT (OUTPATIENT)
Dept: BEHAVIORAL HEALTH | Facility: CLINIC | Age: 39
End: 2024-10-17
Payer: COMMERCIAL

## 2024-10-17 ENCOUNTER — APPOINTMENT (OUTPATIENT)
Dept: OBSTETRICS AND GYNECOLOGY | Facility: CLINIC | Age: 39
End: 2024-10-17
Payer: COMMERCIAL

## 2024-10-17 VITALS
SYSTOLIC BLOOD PRESSURE: 120 MMHG | DIASTOLIC BLOOD PRESSURE: 68 MMHG | HEIGHT: 61 IN | BODY MASS INDEX: 33.79 KG/M2 | WEIGHT: 179 LBS

## 2024-10-17 DIAGNOSIS — F32.A ANXIETY AND DEPRESSION: ICD-10-CM

## 2024-10-17 DIAGNOSIS — Z01.419 ENCOUNTER FOR ANNUAL ROUTINE GYNECOLOGICAL EXAMINATION: Primary | ICD-10-CM

## 2024-10-17 DIAGNOSIS — F41.9 ANXIETY AND DEPRESSION: ICD-10-CM

## 2024-10-17 DIAGNOSIS — N94.6 DYSMENORRHEA: ICD-10-CM

## 2024-10-17 PROCEDURE — 99396 PREV VISIT EST AGE 40-64: CPT | Performed by: OBSTETRICS & GYNECOLOGY

## 2024-10-17 PROCEDURE — 90837 PSYTX W PT 60 MINUTES: CPT | Performed by: SOCIAL WORKER

## 2024-10-17 PROCEDURE — 3008F BODY MASS INDEX DOCD: CPT | Performed by: OBSTETRICS & GYNECOLOGY

## 2024-10-17 PROCEDURE — RXMED WILLOW AMBULATORY MEDICATION CHARGE

## 2024-10-17 RX ORDER — NORETHINDRONE 0.35 MG/1
1 TABLET ORAL DAILY
Qty: 84 TABLET | Refills: 3 | Status: SHIPPED | OUTPATIENT
Start: 2024-10-17 | End: 2025-10-17

## 2024-10-17 ASSESSMENT — PAIN SCALES - GENERAL: PAINLEVEL_OUTOF10: 0-NO PAIN

## 2024-10-17 NOTE — PROGRESS NOTES
"Assessment     PLAN  1. Encounter for annual routine gynecological examination (Primary)  - normal exam today  - mammogram order in place from PCP  - pap up to date    2. Dysmenorrhea  - menses well controlled over past few tears on 5mg norethindrone daily. Discussed option to decrease the dose or move to a mirena IUD for continued menstrual suppression without the high dose systemic progesterone. She desires to trial the lower dose.   - norethindrone (Micronor) 0.35 mg tablet; Take 1 tablet (0.35 mg) over 28 days by mouth once daily.  Dispense: 84 tablet; Refill: 3    Please return for your next visit in 1 year or sooner as needed.    Rose Bejarano MD      Subjective     Freya MCCLOUD Tasneemariella is a 39 y.o. female who is here for a routine exam.   PCP = Ale Hassan, MARK-CNP    APE Concerns: has noticed some possible perimenopausal symptoms  - weight changes  - changes in hair  - sweating more, no discrete hot flashes    Gynecologic History:    OBHx: G0, step mother to 16 year old son  Menses: scant spotting over past year, usually associated with stress  Last Pap: NILM/neg HPV in 6/2022  HPV Vaccine: Had 2/3 of vaccines, will consider completing the series  History of Dysplasia: History of HPV in distant past   Sexually active: Active with 1 partner  STI testing: Declines  Contraception: norethindrone 5mg daily, a few years  Mammogram: order in place from PCP  FamHx of GYN cancers:  Mom had hysterectomy but unsure why   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      Objective   /68   Ht 1.549 m (5' 1\")   Wt 81.2 kg (179 lb)   BMI 33.82 kg/m²      General:   Alert and oriented, in no acute distress   Neck: Supple. No visible thyromegaly.    Breast/Axilla: Normal to palpation bilaterally without masses, skin changes, or nipple discharge.    Abdomen: Soft, non-tender, without masses or organomegaly   Vulva: Normal " architecture without erythema, masses, or lesions.    Vagina: Normal mucosa without lesions, masses, or atrophy. No abnormal vaginal discharge.    Cervix: Normal without masses, lesions, or signs of cervicitis.    Uterus: Normal mobile, non-enlarged uterus    Adnexa: Normal without masses or lesions   Pelvic Floor No POP noted. No high tone pelvic floor   Psych Normal affect. Normal mood.
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
## 2024-10-21 ENCOUNTER — PHARMACY VISIT (OUTPATIENT)
Dept: PHARMACY | Facility: CLINIC | Age: 39
End: 2024-10-21
Payer: COMMERCIAL

## 2024-10-24 ENCOUNTER — APPOINTMENT (OUTPATIENT)
Dept: BEHAVIORAL HEALTH | Facility: CLINIC | Age: 39
End: 2024-10-24
Payer: COMMERCIAL

## 2024-10-24 DIAGNOSIS — F41.9 ANXIETY: ICD-10-CM

## 2024-10-24 DIAGNOSIS — F33.42 MAJOR DEPRESSIVE DISORDER, RECURRENT EPISODE, IN FULL REMISSION (CMS-HCC): ICD-10-CM

## 2024-10-24 PROCEDURE — 90837 PSYTX W PT 60 MINUTES: CPT | Performed by: SOCIAL WORKER
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
## 2024-10-31 ENCOUNTER — APPOINTMENT (OUTPATIENT)
Dept: BEHAVIORAL HEALTH | Facility: CLINIC | Age: 39
End: 2024-10-31
Payer: COMMERCIAL

## 2024-10-31 DIAGNOSIS — F32.A ANXIETY AND DEPRESSION: ICD-10-CM

## 2024-10-31 DIAGNOSIS — F41.9 ANXIETY AND DEPRESSION: ICD-10-CM

## 2024-10-31 PROCEDURE — 90837 PSYTX W PT 60 MINUTES: CPT | Performed by: SOCIAL WORKER

## 2024-11-07 ENCOUNTER — APPOINTMENT (OUTPATIENT)
Dept: BEHAVIORAL HEALTH | Facility: CLINIC | Age: 39
End: 2024-11-07
Payer: COMMERCIAL

## 2024-11-07 DIAGNOSIS — F41.9 ANXIETY AND DEPRESSION: ICD-10-CM

## 2024-11-07 DIAGNOSIS — F32.A ANXIETY AND DEPRESSION: ICD-10-CM

## 2024-11-07 PROCEDURE — 90837 PSYTX W PT 60 MINUTES: CPT | Performed by: SOCIAL WORKER
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
## 2024-11-14 ENCOUNTER — APPOINTMENT (OUTPATIENT)
Dept: BEHAVIORAL HEALTH | Facility: CLINIC | Age: 39
End: 2024-11-14
Payer: COMMERCIAL

## 2024-11-21 ENCOUNTER — APPOINTMENT (OUTPATIENT)
Dept: BEHAVIORAL HEALTH | Facility: CLINIC | Age: 39
End: 2024-11-21
Payer: COMMERCIAL

## 2024-11-22 ENCOUNTER — APPOINTMENT (OUTPATIENT)
Dept: BEHAVIORAL HEALTH | Facility: CLINIC | Age: 39
End: 2024-11-22
Payer: COMMERCIAL

## 2024-11-22 DIAGNOSIS — F33.0 MILD EPISODE OF RECURRENT MAJOR DEPRESSIVE DISORDER (CMS-HCC): ICD-10-CM

## 2024-11-22 DIAGNOSIS — F41.9 ANXIETY: ICD-10-CM

## 2024-11-22 DIAGNOSIS — F41.0 PANIC DISORDER: ICD-10-CM

## 2024-11-22 PROCEDURE — 99213 OFFICE O/P EST LOW 20 MIN: CPT | Performed by: CLINICAL NURSE SPECIALIST

## 2024-11-22 PROCEDURE — RXMED WILLOW AMBULATORY MEDICATION CHARGE

## 2024-11-22 RX ORDER — BUPROPION HYDROCHLORIDE 150 MG/1
150 TABLET, EXTENDED RELEASE ORAL DAILY
Qty: 90 TABLET | Refills: 1 | Status: SHIPPED | OUTPATIENT
Start: 2024-11-22 | End: 2025-02-24

## 2024-11-22 RX ORDER — BUSPIRONE HYDROCHLORIDE 5 MG/1
5 TABLET ORAL 2 TIMES DAILY
Qty: 180 TABLET | Refills: 1 | Status: SHIPPED | OUTPATIENT
Start: 2024-11-22 | End: 2025-02-20

## 2024-11-22 RX ORDER — SERTRALINE HYDROCHLORIDE 100 MG/1
200 TABLET, FILM COATED ORAL DAILY
Qty: 180 TABLET | Refills: 1 | Status: SHIPPED | OUTPATIENT
Start: 2024-11-22 | End: 2025-02-20

## 2024-11-22 NOTE — PROGRESS NOTES
Outpatient Psychiatry      Subjective   Freya Sanz, a 39 y.o. female, presents for a routine follow up appointment as an established patient for medication management /outpatient psychiatry for a virtual appointment , for audio and video appointment , she was at home for this appointment     Virtual or Telephone Consent     An interactive audio and video telecommunication system which permits real time communications between the patient (at the originating site) and provider (at the distant site) was utilized to provide this telehealth service.   Verbal consent was requested and obtained from Freya Sanz on this date, 11/22/24 for a telehealth visit.       Diagnosis:    · mild Major depressive disorder, recurrent episode F33.0   · Panic disorder mild F41.0  Anxiety mild F41.9        Problem List         Patient Active Problem List   Diagnosis    Melanocytic nevi of lower extremity or hip    Bilateral myopia    Cafe au lait spots    Cervicalgia    Acute left-sided low back pain without sciatica    Chronic left-sided low back pain with left-sided sciatica    Closed nondisplaced fracture of proximal phalanx of lesser toe of right foot    Current smoker    Hemangioma of skin and subcutaneous tissue    History of vitamin D deficiency    HPV in female    Left lumbar radiculopathy    Major depressive disorder, recurrent episode, in full remission (CMS-HCC)    Melanocytic nevi of trunk    Melanocytic nevi of other parts of face    Myalgia    Nicotine use disorder    Other skin changes due to chronic exposure to nonionizing radiation    Other melanin hyperpigmentation    Postural strain    Right foot injury, initial encounter    Back pain    Pelvic pain in female    Sacral back pain    Sacroiliitis (CMS-HCC)    Segmental and somatic dysfunction    Shortness of breath on exertion    Spondylosis, lumbosacral    Sprain of right ankle, initial encounter    Vitamin D insufficiency    Abnormal uterine bleeding (AUB)     Acne vulgaris    Anxiety and depression    ASCUS with positive high risk HPV cervical    Astigmatism, bilateral    Bertolotti's syndrome    Gastritis, acute    Anxiety    Stress due to family tension    Epigastric abdominal pain    Bloating    Grief         Treatment Plan/Recommendations:   Follow-up plan was discussed with patient.  8 weeks follow up , can continue self care and wellness efforts , can call  for treatment and scheduling concerns , can maintain routine health screenings   Psychotropic medications :   Continue   Bupropion sr 150 mg , daily for depression   Buspar 5 mg twice a day for anxiety   Sertraline 100 mg 2 tablets daily for depression and anxiety  Discontinue Hydroxyzine 10 mg , twice a day as needed for anxiety ( not needed , has not taken in a while per patient report )         Review with patient: Treatment plan reviewed with the patient.  Medication risks/benefit reviewed with the patient     HPI:  She has not had to use her fmla recently    Has dealt with anxiety , feels nervous and worried at times , this is manageable with the therapy she has been doing   Sleep varies in quality   No panic attacks recently   She was having nightmares about when her brother  and she has not had one of those in a few weeks    no concerns about memory issues   no history of emanuel or hypomania   reconciled medication list in the [facility] emr and provided psychoeducation      Psych ros and medical ros : as noted in hpi section of this note                  Patient Active Problem List   Diagnosis    Melanocytic nevi of lower extremity or hip    Bilateral myopia    Cafe au lait spots    Cervicalgia    Acute left-sided low back pain without sciatica    Chronic left-sided low back pain with left-sided sciatica    Closed nondisplaced fracture of proximal phalanx of lesser toe of right foot    Current smoker    Hemangioma of skin and subcutaneous tissue    History of vitamin D deficiency    HPV in  female    Left lumbar radiculopathy    Major depressive disorder, recurrent episode, in full remission (CMS-HCC)    Melanocytic nevi of trunk    Melanocytic nevi of other parts of face    Myalgia    Nicotine use disorder    Other skin changes due to chronic exposure to nonionizing radiation    Other melanin hyperpigmentation    Postural strain    Right foot injury, initial encounter    Back pain    Pelvic pain in female    Sacral back pain    Sacroiliitis (CMS-HCC)    Segmental and somatic dysfunction    Shortness of breath on exertion    Spondylosis, lumbosacral    Sprain of right ankle, initial encounter    Vitamin D insufficiency    Abnormal uterine bleeding (AUB)    Acne vulgaris    Anxiety and depression    ASCUS with positive high risk HPV cervical    Astigmatism, bilateral    Bertolotti's syndrome    Gastritis, acute    Anxiety    Stress due to family tension    Epigastric abdominal pain    Bloating    Grief    Annual physical exam    Breast cancer screening by mammogram     Current Outpatient Medications:     buPROPion SR (Wellbutrin SR) 150 mg 12 hr tablet, Take 1 tablet (150 mg) by mouth once daily., Disp: 90 tablet, Rfl: 0    busPIRone (Buspar) 5 mg tablet, Take 1 tablet (5 mg) by mouth 2 times a day., Disp: 180 tablet, Rfl: 0    multivitamin-Ca-iron-minerals (Tab-A-Albert Womens) 27-0.4 mg tablet, , Disp: , Rfl:     norethindrone (Aygestin) 5 mg tablet, Take 1 tablet (5 mg) by mouth once daily., Disp: 90 tablet, Rfl: 3    norethindrone (Micronor) 0.35 mg tablet, Take 1 tablet (0.35 mg) over 28 days by mouth once daily., Disp: 84 tablet, Rfl: 3    pantoprazole (ProtoNix) 40 mg EC tablet, Take 1 tablet (40 mg) by mouth 2 times a day. Do not crush, chew, or split., Disp: 60 tablet, Rfl: 2    sertraline (Zoloft) 100 mg tablet, Take 2 tablets (200 mg) by mouth once daily., Disp: 180 tablet, Rfl: 0    Current Facility-Administered Medications:     omeprazole (PriLOSEC) DR capsule 20 mg, 20 mg, oral, Once, Sheridan ASHLEY  ALON TrevizoN-CNP  Medical History:  Past Medical History:   Diagnosis Date    Major depressive disorder, single episode, in partial remission (CMS-[facility]) 05/15/2017    Depression, major, single episode, in partial remission    Other specified health status     No pertinent past surgical history    Other specified health status     No pertinent past medical history    Post-traumatic stress disorder, unspecified     Post-traumatic stress disorder    Viral upper respiratory tract infection 05/11/2023     Surgical History:  Past Surgical History:   Procedure Laterality Date    MOUTH SURGERY  02/25/2016    Oral Surgery Tooth Extraction    TONSILLECTOMY  02/25/2016    Tonsillectomy     Family History:  No family history on file.  Social History:  Social History     Socioeconomic History    Marital status:      Spouse name: Not on file    Number of children: Not on file    Years of education: Not on file    Highest education level: Not on file   Occupational History    Not on file   Tobacco Use    Smoking status: Every Day     Current packs/day: 0.25     Types: Cigarettes    Smokeless tobacco: Never   Vaping Use    Vaping status: Never Used   Substance and Sexual Activity    Alcohol use: Yes    Drug use: Never    Sexual activity: Defer   Other Topics Concern    Not on file   Social History Narrative    Not on file     Social Drivers of Health     Financial Resource Strain: Not on file   Food Insecurity: Not on file   Transportation Needs: Not on file   Physical Activity: Not on file   Stress: Not on file   Social Connections: Not on file   Intimate Partner Violence: Not on file   Housing Stability: Not on file     Vitals:  There were no vitals filed for this visit.    Carol Hodges, APRN-CNS

## 2024-11-25 PROCEDURE — RXMED WILLOW AMBULATORY MEDICATION CHARGE

## 2024-11-26 ENCOUNTER — PHARMACY VISIT (OUTPATIENT)
Dept: PHARMACY | Facility: CLINIC | Age: 39
End: 2024-11-26
Payer: COMMERCIAL

## 2024-12-05 ENCOUNTER — APPOINTMENT (OUTPATIENT)
Dept: BEHAVIORAL HEALTH | Facility: CLINIC | Age: 39
End: 2024-12-05
Payer: COMMERCIAL

## 2024-12-05 DIAGNOSIS — F32.A ANXIETY AND DEPRESSION: ICD-10-CM

## 2024-12-05 DIAGNOSIS — F41.9 ANXIETY AND DEPRESSION: ICD-10-CM

## 2024-12-05 PROCEDURE — 90837 PSYTX W PT 60 MINUTES: CPT | Performed by: SOCIAL WORKER
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
## 2024-12-12 ENCOUNTER — APPOINTMENT (OUTPATIENT)
Dept: BEHAVIORAL HEALTH | Facility: CLINIC | Age: 39
End: 2024-12-12
Payer: COMMERCIAL

## 2024-12-12 DIAGNOSIS — F33.42 MAJOR DEPRESSIVE DISORDER, RECURRENT EPISODE, IN FULL REMISSION (CMS-HCC): ICD-10-CM

## 2024-12-12 PROCEDURE — 90837 PSYTX W PT 60 MINUTES: CPT | Performed by: SOCIAL WORKER
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
## 2024-12-19 ENCOUNTER — APPOINTMENT (OUTPATIENT)
Dept: BEHAVIORAL HEALTH | Facility: CLINIC | Age: 39
End: 2024-12-19
Payer: COMMERCIAL

## 2024-12-26 ENCOUNTER — PHARMACY VISIT (OUTPATIENT)
Dept: PHARMACY | Facility: CLINIC | Age: 39
End: 2024-12-26
Payer: COMMERCIAL

## 2024-12-26 ENCOUNTER — APPOINTMENT (OUTPATIENT)
Dept: BEHAVIORAL HEALTH | Facility: CLINIC | Age: 39
End: 2024-12-26
Payer: COMMERCIAL

## 2024-12-26 PROCEDURE — RXMED WILLOW AMBULATORY MEDICATION CHARGE

## 2025-01-07 DIAGNOSIS — N94.6 DYSMENORRHEA: Primary | ICD-10-CM

## 2025-01-07 RX ORDER — NORETHINDRONE 5 MG/1
2.5 TABLET ORAL DAILY
Qty: 45 TABLET | Refills: 4 | Status: SHIPPED | OUTPATIENT
Start: 2025-01-07 | End: 2025-04-10

## 2025-01-08 PROCEDURE — RXMED WILLOW AMBULATORY MEDICATION CHARGE

## 2025-01-10 ENCOUNTER — PHARMACY VISIT (OUTPATIENT)
Dept: PHARMACY | Facility: CLINIC | Age: 40
End: 2025-01-10
Payer: COMMERCIAL

## 2025-01-16 ENCOUNTER — APPOINTMENT (OUTPATIENT)
Dept: BEHAVIORAL HEALTH | Facility: CLINIC | Age: 40
End: 2025-01-16
Payer: COMMERCIAL

## 2025-01-16 DIAGNOSIS — F32.A ANXIETY AND DEPRESSION: ICD-10-CM

## 2025-01-16 DIAGNOSIS — F41.9 ANXIETY AND DEPRESSION: ICD-10-CM

## 2025-01-16 PROCEDURE — 90837 PSYTX W PT 60 MINUTES: CPT | Performed by: SOCIAL WORKER

## 2025-01-16 NOTE — PROGRESS NOTES
Televideo Informed Consent for psychological evaluation was reviewed with the patient as follows:     There are potential benefits and risks of the use of telephone or video-conferencing that differ from in-person sessions. Specifically, the telephone or televideo system we are using may not be HIPPA compliant and may present limits to patient confidentiality. Confidentiality still applies for telepsychology services, and nobody will record the session without your permission.    Understanding and verbal agreement was attested to by the patient. Patient identity was confirmed using 3 sources, including telephone number, email address and date of birth. Provision of services via telehealth was necessitated by the restrictions on face-to-face visits accompanying the COVID-19 pandemic.     SECURE NOTE:  This document may not be released or reproduced in any form without the consent of either the Provider, the Provider´s  or the Chair of the Department of Psychiatry. This prohibition includes copying the document into any non-Restricted area of the Ambulatory Electronic Medical Record.      Start time : 9:01 am  End time : 9:55 am    Diagnoses : CPTSD, NICOLE    Clients current issues: Norm is doing better. He is following the rules.   She has been getting her BRO's house cleaned out.   Some stress at work. 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    Treatment interventions: Problem solved some of the work issues with client.  Processed clients grief with her.  Suggested the book Let Them Book    Prognosis: good    Treatment plan update: Client is functioning better.      Continue weekly ongoing psychotherapy.

## 2025-01-23 ENCOUNTER — APPOINTMENT (OUTPATIENT)
Dept: BEHAVIORAL HEALTH | Facility: CLINIC | Age: 40
End: 2025-01-23
Payer: COMMERCIAL

## 2025-01-30 ENCOUNTER — APPOINTMENT (OUTPATIENT)
Dept: BEHAVIORAL HEALTH | Facility: CLINIC | Age: 40
End: 2025-01-30
Payer: COMMERCIAL

## 2025-01-30 DIAGNOSIS — F32.A ANXIETY AND DEPRESSION: ICD-10-CM

## 2025-01-30 DIAGNOSIS — F41.9 ANXIETY AND DEPRESSION: ICD-10-CM

## 2025-01-30 PROCEDURE — 90837 PSYTX W PT 60 MINUTES: CPT | Performed by: SOCIAL WORKER

## 2025-01-30 NOTE — PROGRESS NOTES
Televideo Informed Consent for psychological evaluation was reviewed with the patient as follows:     There are potential benefits and risks of the use of telephone or video-conferencing that differ from in-person sessions. Specifically, the telephone or televideo system we are using may not be HIPPA compliant and may present limits to patient confidentiality. Confidentiality still applies for telepsychology services, and nobody will record the session without your permission.    Understanding and verbal agreement was attested to by the patient. Patient identity was confirmed using 3 sources, including telephone number, email address and date of birth. Provision of services via telehealth was necessitated by the restrictions on face-to-face visits accompanying the COVID-19 pandemic.     SECURE NOTE:  This document may not be released or reproduced in any form without the consent of either the Provider, the Provider´s  or the Chair of the Department of Psychiatry. This prohibition includes copying the document into any non-Restricted area of the Ambulatory Electronic Medical Record.      Start time : 9:01 am  End time : 9:57 am    Diagnoses : MDD, NICOLE, CPTSD    Clients current issues: Client did not get the job she wanted.   She has her BRO's house ready for the [employer].     Client reports things are better with Norm.    Treatment interventions: Helped client process her grief. Discussed her upbring and how this impacted her and her BRO.  IFS - explored her part that feels guilty about her BRO's suicide.  Her part fears that he won't exist if she doesn't keep investing in his memory.  Her part keeps saying if I would have done this then...  If it doesn't keep this up she would lose her closeness to him and her connection to him.   --- she has his dog and his band shirts, listening to his music ( songs for the anastacio )   Provided hope to this part.     Prognosis: good    Treatment plan update: Client  is doing well.      Continue weekly ongoing psychotherapy.

## 2025-02-06 ENCOUNTER — APPOINTMENT (OUTPATIENT)
Dept: BEHAVIORAL HEALTH | Facility: CLINIC | Age: 40
End: 2025-02-06
Payer: COMMERCIAL

## 2025-02-13 ENCOUNTER — APPOINTMENT (OUTPATIENT)
Dept: BEHAVIORAL HEALTH | Facility: CLINIC | Age: 40
End: 2025-02-13
Payer: COMMERCIAL

## 2025-02-13 DIAGNOSIS — F32.A ANXIETY AND DEPRESSION: ICD-10-CM

## 2025-02-13 DIAGNOSIS — F41.9 ANXIETY AND DEPRESSION: ICD-10-CM

## 2025-02-13 PROCEDURE — 90837 PSYTX W PT 60 MINUTES: CPT | Performed by: SOCIAL WORKER

## 2025-02-13 NOTE — PROGRESS NOTES
Televideo Informed Consent for psychological evaluation was reviewed with the patient as follows:     There are potential benefits and risks of the use of telephone or video-conferencing that differ from in-person sessions. Specifically, the telephone or televideo system we are using may not be HIPPA compliant and may present limits to patient confidentiality. Confidentiality still applies for telepsychology services, and nobody will record the session without your permission.    Understanding and verbal agreement was attested to by the patient. Patient identity was confirmed using 3 sources, including telephone number, email address and date of birth. Provision of services via telehealth was necessitated by the restrictions on face-to-face visits accompanying the COVID-19 pandemic.     SECURE NOTE:  This document may not be released or reproduced in any form without the consent of either the Provider, the Provider´s  or the Chair of the Department of Psychiatry. This prohibition includes copying the document into any non-Restricted area of the Ambulatory Electronic Medical Record.      Start time : 9:00 am  End time : 10:00 am    Diagnoses : MDD, NICOLE    Clients current issues: Norm is addicted to Xbox.  Work is going well.     Treatment interventions: Supported client in staying detached from parenting Norm.   Discussed clients beliefs about the afterlife. Worked through her grief.     Prognosis:  good    Treatment plan update: Client is achieving her goals.      Continue weekly ongoing psychotherapy.   
48

## 2025-02-20 ENCOUNTER — APPOINTMENT (OUTPATIENT)
Dept: BEHAVIORAL HEALTH | Facility: CLINIC | Age: 40
End: 2025-02-20
Payer: COMMERCIAL

## 2025-02-20 ENCOUNTER — SOCIAL WORK (OUTPATIENT)
Dept: BEHAVIORAL HEALTH | Facility: CLINIC | Age: 40
End: 2025-02-20
Payer: COMMERCIAL

## 2025-02-20 DIAGNOSIS — F41.9 ANXIETY: ICD-10-CM

## 2025-02-20 DIAGNOSIS — F41.0 PANIC DISORDER: ICD-10-CM

## 2025-02-20 DIAGNOSIS — F32.A ANXIETY AND DEPRESSION: ICD-10-CM

## 2025-02-20 DIAGNOSIS — F33.0 MILD EPISODE OF RECURRENT MAJOR DEPRESSIVE DISORDER (CMS-HCC): ICD-10-CM

## 2025-02-20 DIAGNOSIS — F41.9 ANXIETY AND DEPRESSION: ICD-10-CM

## 2025-02-20 PROCEDURE — 90837 PSYTX W PT 60 MINUTES: CPT | Performed by: SOCIAL WORKER

## 2025-02-20 PROCEDURE — RXMED WILLOW AMBULATORY MEDICATION CHARGE

## 2025-02-20 PROCEDURE — 99213 OFFICE O/P EST LOW 20 MIN: CPT | Performed by: CLINICAL NURSE SPECIALIST

## 2025-02-20 RX ORDER — SERTRALINE HYDROCHLORIDE 100 MG/1
200 TABLET, FILM COATED ORAL DAILY
Qty: 180 TABLET | Refills: 1 | Status: SHIPPED | OUTPATIENT
Start: 2025-02-20 | End: 2025-05-21

## 2025-02-20 RX ORDER — BUPROPION HYDROCHLORIDE 150 MG/1
150 TABLET, EXTENDED RELEASE ORAL DAILY
Qty: 90 TABLET | Refills: 1 | Status: SHIPPED | OUTPATIENT
Start: 2025-02-20 | End: 2025-05-26

## 2025-02-20 RX ORDER — BUSPIRONE HYDROCHLORIDE 5 MG/1
5 TABLET ORAL 2 TIMES DAILY
Qty: 180 TABLET | Refills: 1 | Status: SHIPPED | OUTPATIENT
Start: 2025-02-20 | End: 2025-05-21

## 2025-02-20 NOTE — PROGRESS NOTES
Televideo Informed Consent for psychological evaluation was reviewed with the patient as follows:     There are potential benefits and risks of the use of telephone or video-conferencing that differ from in-person sessions. Specifically, the telephone or televideo system we are using may not be HIPPA compliant and may present limits to patient confidentiality. Confidentiality still applies for telepsychology services, and nobody will record the session without your permission.    Understanding and verbal agreement was attested to by the patient. Patient identity was confirmed using 3 sources, including telephone number, email address and date of birth. Provision of services via telehealth was necessitated by the restrictions on face-to-face visits accompanying the COVID-19 pandemic.     SECURE NOTE:  This document may not be released or reproduced in any form without the consent of either the Provider, the Provider´s  or the Chair of the Department of Psychiatry. This prohibition includes copying the document into any non-Restricted area of the Ambulatory Electronic Medical Record.      Start time : 8:00 am  End time : 9:00 am    Diagnoses : MDD, NICOLE    Clients current issues: Norm is back in school. He had a job interview.  R[employer] FA .    Treatment interventions: Explored ways client can work with Norm before their trip to [place] to be ready for difficulties.    Client reports she is is evaluating how to improve her work situation.     Prognosis: good    Treatment plan update: Client is coping well and making progress.      Continue weekly ongoing psychotherapy.

## 2025-02-20 NOTE — PROGRESS NOTES
Outpatient Psychiatry      Subjective Freya Sanz, a 39 y.o. female.  presents for a routine follow up appointment as an established patient for medication management /outpatient psychiatry for a virtual appointment , for audio and video appointment , she was at home for this appointment      Virtual or Telephone Consent     An interactive audio and video telecommunication system which permits real time communications between the patient (at the originating site) and provider (at the distant site) was utilized to provide this telehealth service.   Verbal consent was requested and obtained from Freya Sanz on this date, 2/20/25 for a telehealth visit.       Diagnosis:    · mild Major depressive disorder, recurrent episode F33.0   · Panic disorder mild F41.0  Anxiety mild F41.9      Problem List         Patient Active Problem List   Diagnosis    Melanocytic nevi of lower extremity or hip    Bilateral myopia    Cafe au lait spots    Cervicalgia    Acute left-sided low back pain without sciatica    Chronic left-sided low back pain with left-sided sciatica    Closed nondisplaced fracture of proximal phalanx of lesser toe of right foot    Current smoker    Hemangioma of skin and subcutaneous tissue    History of vitamin D deficiency    HPV in female    Left lumbar radiculopathy    Major depressive disorder, recurrent episode, in full remission (CMS-HCC)    Melanocytic nevi of trunk    Melanocytic nevi of other parts of face    Myalgia    Nicotine use disorder    Other skin changes due to chronic exposure to nonionizing radiation    Other melanin hyperpigmentation    Postural strain    Right foot injury, initial encounter    Back pain    Pelvic pain in female    Sacral back pain    Sacroiliitis (CMS-HCC)    Segmental and somatic dysfunction    Shortness of breath on exertion    Spondylosis, lumbosacral    Sprain of right ankle, initial encounter    Vitamin D insufficiency    Abnormal uterine bleeding (AUB)    Acne  vulgaris    Anxiety and depression    ASCUS with positive high risk HPV cervical    Astigmatism, bilateral    Bertolotti's syndrome    Gastritis, acute    Anxiety    Stress due to family tension    Epigastric abdominal pain    Bloating    Grief         Treatment Plan/Recommendations:   Follow-up plan was discussed with patient.  8 weeks follow up , can continue self care and wellness efforts , can call  for treatment and scheduling concerns , can maintain routine health screenings   Psychotropic medications :   Continue Bupropion sr 150 mg , daily for depression   Continue Buspar 5 mg twice a day for anxiety   Continue Sertraline 100 mg 2 tablets daily for depression and anxiety  Discontinue Hydroxyzine 10 mg , twice a day as needed for anxiety ( not needed , has not taken in a while per patient report )      Review with patient: Treatment plan reviewed with the patient.  Medication risks/benefit reviewed with the patient     HPI:  She has not had to use her fmla recently    Has dealt with anxiety , feels nervous and worried at times , this is manageable with the therapy she has been doing   Sleep varies in quality   No panic attacks recently   She was having nightmares about when her brother  and she has not had one of those in a few weeks    no concerns about memory issues   no history of emanuel or hypomania   reconciled medication list in the [facility] emr and provided psychoeducation      Psych ros and medical ros : as noted in hpi section of this note             Patient Active Problem List   Diagnosis    Melanocytic nevi of lower extremity or hip    Bilateral myopia    Cafe au lait spots    Cervicalgia    Acute left-sided low back pain without sciatica    Chronic left-sided low back pain with left-sided sciatica    Closed nondisplaced fracture of proximal phalanx of lesser toe of right foot    Current smoker    Hemangioma of skin and subcutaneous tissue    History of vitamin D deficiency    HPV in  female    Left lumbar radiculopathy    Major depressive disorder, recurrent episode, in full remission (CMS-HCC)    Melanocytic nevi of trunk    Melanocytic nevi of other parts of face    Myalgia    Nicotine use disorder    Other skin changes due to chronic exposure to nonionizing radiation    Other melanin hyperpigmentation    Postural strain    Right foot injury, initial encounter    Back pain    Pelvic pain in female    Sacral back pain    Sacroiliitis (CMS-HCC)    Segmental and somatic dysfunction    Shortness of breath on exertion    Spondylosis, lumbosacral    Sprain of right ankle, initial encounter    Vitamin D insufficiency    Abnormal uterine bleeding (AUB)    Acne vulgaris    Anxiety and depression    ASCUS with positive high risk HPV cervical    Astigmatism, bilateral    Bertolotti's syndrome    Gastritis, acute    Anxiety    Stress due to family tension    Epigastric abdominal pain    Bloating    Grief    Annual physical exam    Breast cancer screening by mammogram     Current Outpatient Medications:     buPROPion SR (Wellbutrin SR) 150 mg 12 hr tablet, Take 1 tablet (150 mg) by mouth once daily., Disp: 90 tablet, Rfl: 1    busPIRone (Buspar) 5 mg tablet, Take 1 tablet (5 mg) by mouth 2 times a day., Disp: 180 tablet, Rfl: 1    multivitamin-Ca-iron-minerals (Tab-A-Albert Womens) 27-0.4 mg tablet, , Disp: , Rfl:     norethindrone (Aygestin) 5 mg tablet, Take 0.5 tablets (2.5 mg) by mouth once daily., Disp: 45 tablet, Rfl: 4    pantoprazole (ProtoNix) 40 mg EC tablet, Take 1 tablet (40 mg) by mouth 2 times a day. Do not crush, chew, or split., Disp: 60 tablet, Rfl: 2    sertraline (Zoloft) 100 mg tablet, Take 2 tablets (200 mg) by mouth once daily., Disp: 180 tablet, Rfl: 1    Current Facility-Administered Medications:     omeprazole (PriLOSEC) DR capsule 20 mg, 20 mg, oral, Once, Sheridan Trevizo APRN-CNP  Medical History:  Past Medical History:   Diagnosis Date    Major depressive disorder, single episode,  in partial remission (CMS-[facility]) 05/15/2017    Depression, major, single episode, in partial remission    Other specified health status     No pertinent past surgical history    Other specified health status     No pertinent past medical history    Post-traumatic stress disorder, unspecified     Post-traumatic stress disorder    Viral upper respiratory tract infection 05/11/2023     Surgical History:  Past Surgical History:   Procedure Laterality Date    MOUTH SURGERY  02/25/2016    Oral Surgery Tooth Extraction    TONSILLECTOMY  02/25/2016    Tonsillectomy     Family History:  No family history on file.  Social History:  Social History     Socioeconomic History    Marital status:      Spouse name: Not on file    Number of children: Not on file    Years of education: Not on file    Highest education level: Not on file   Occupational History    Not on file   Tobacco Use    Smoking status: Every Day     Current packs/day: 0.25     Types: Cigarettes    Smokeless tobacco: Never   Vaping Use    Vaping status: Never Used   Substance and Sexual Activity    Alcohol use: Yes    Drug use: Never    Sexual activity: Defer   Other Topics Concern    Not on file   Social History Narrative    Not on file     Social Drivers of Health     Financial Resource Strain: Not on file   Food Insecurity: Not on file   Transportation Needs: Not on file   Physical Activity: Not on file   Stress: Not on file   Social Connections: Not on file   Intimate Partner Violence: Not on file   Housing Stability: Not on file     Vitals:  There were no vitals filed for this visit.    Carol Hodges, MARK-CNS

## 2025-02-25 ENCOUNTER — PHARMACY VISIT (OUTPATIENT)
Dept: PHARMACY | Facility: CLINIC | Age: 40
End: 2025-02-25
Payer: COMMERCIAL

## 2025-02-27 ENCOUNTER — APPOINTMENT (OUTPATIENT)
Dept: BEHAVIORAL HEALTH | Facility: CLINIC | Age: 40
End: 2025-02-27
Payer: COMMERCIAL

## 2025-03-06 ENCOUNTER — APPOINTMENT (OUTPATIENT)
Dept: BEHAVIORAL HEALTH | Facility: CLINIC | Age: 40
End: 2025-03-06
Payer: COMMERCIAL

## 2025-03-13 ENCOUNTER — APPOINTMENT (OUTPATIENT)
Dept: BEHAVIORAL HEALTH | Facility: CLINIC | Age: 40
End: 2025-03-13
Payer: COMMERCIAL

## 2025-03-13 DIAGNOSIS — F32.A ANXIETY AND DEPRESSION: ICD-10-CM

## 2025-03-13 DIAGNOSIS — F41.9 ANXIETY AND DEPRESSION: ICD-10-CM

## 2025-03-13 PROCEDURE — 90837 PSYTX W PT 60 MINUTES: CPT | Performed by: SOCIAL WORKER
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
## 2025-04-22 ENCOUNTER — PHARMACY VISIT (OUTPATIENT)
Dept: PHARMACY | Facility: CLINIC | Age: 40
End: 2025-04-22
Payer: COMMERCIAL

## 2025-04-22 DIAGNOSIS — K21.9 GASTROESOPHAGEAL REFLUX DISEASE WITHOUT ESOPHAGITIS: ICD-10-CM

## 2025-04-22 PROCEDURE — RXMED WILLOW AMBULATORY MEDICATION CHARGE

## 2025-04-24 RX ORDER — PANTOPRAZOLE SODIUM 40 MG/1
40 TABLET, DELAYED RELEASE ORAL 2 TIMES DAILY
Qty: 60 TABLET | Refills: 2 | OUTPATIENT
Start: 2025-04-24 | End: 2025-07-23

## 2025-04-28 DIAGNOSIS — K21.9 GASTROESOPHAGEAL REFLUX DISEASE WITHOUT ESOPHAGITIS: ICD-10-CM

## 2025-04-28 RX ORDER — PANTOPRAZOLE SODIUM 40 MG/1
40 TABLET, DELAYED RELEASE ORAL 2 TIMES DAILY
Qty: 60 TABLET | Refills: 2 | OUTPATIENT
Start: 2025-04-28 | End: 2025-07-27

## 2025-05-08 ENCOUNTER — APPOINTMENT (OUTPATIENT)
Dept: RADIOLOGY | Facility: HOSPITAL | Age: 40
End: 2025-05-08
Payer: COMMERCIAL

## 2025-05-08 VITALS — HEIGHT: 61 IN | BODY MASS INDEX: 33.79 KG/M2 | WEIGHT: 179 LBS

## 2025-05-08 DIAGNOSIS — Z00.00 ANNUAL PHYSICAL EXAM: ICD-10-CM

## 2025-05-08 DIAGNOSIS — Z12.31 BREAST CANCER SCREENING BY MAMMOGRAM: ICD-10-CM

## 2025-05-08 PROCEDURE — 77067 SCR MAMMO BI INCL CAD: CPT | Performed by: RADIOLOGY

## 2025-05-08 PROCEDURE — 77063 BREAST TOMOSYNTHESIS BI: CPT | Performed by: RADIOLOGY

## 2025-05-08 PROCEDURE — 77063 BREAST TOMOSYNTHESIS BI: CPT

## 2025-05-12 ENCOUNTER — OFFICE VISIT (OUTPATIENT)
Dept: URGENT CARE | Age: 40
End: 2025-05-12
Payer: COMMERCIAL

## 2025-05-12 VITALS
DIASTOLIC BLOOD PRESSURE: 80 MMHG | BODY MASS INDEX: 32.2 KG/M2 | WEIGHT: 175 LBS | TEMPERATURE: 98.4 F | HEART RATE: 64 BPM | SYSTOLIC BLOOD PRESSURE: 110 MMHG | HEIGHT: 62 IN | RESPIRATION RATE: 16 BRPM | OXYGEN SATURATION: 98 %

## 2025-05-12 DIAGNOSIS — J02.9 SORE THROAT: ICD-10-CM

## 2025-05-12 DIAGNOSIS — J02.9 ACUTE PHARYNGITIS, UNSPECIFIED ETIOLOGY: Primary | ICD-10-CM

## 2025-05-12 LAB
POC HUMAN RHINOVIRUS PCR: NEGATIVE
POC INFLUENZA A VIRUS PCR: NEGATIVE
POC INFLUENZA B VIRUS PCR: NEGATIVE
POC RESPIRATORY SYNCYTIAL VIRUS PCR: NEGATIVE
POC STREPTOCOCCUS PYOGENES (GROUP A STREP) PCR: NEGATIVE

## 2025-05-12 PROCEDURE — 99203 OFFICE O/P NEW LOW 30 MIN: CPT | Performed by: NURSE PRACTITIONER

## 2025-05-12 PROCEDURE — 99070 SPECIAL SUPPLIES PHYS/QHP: CPT | Performed by: NURSE PRACTITIONER

## 2025-05-12 PROCEDURE — 87631 RESP VIRUS 3-5 TARGETS: CPT | Performed by: NURSE PRACTITIONER

## 2025-05-12 PROCEDURE — 3008F BODY MASS INDEX DOCD: CPT | Performed by: NURSE PRACTITIONER

## 2025-05-12 PROCEDURE — 87651 STREP A DNA AMP PROBE: CPT | Performed by: NURSE PRACTITIONER

## 2025-05-12 RX ORDER — PREDNISONE 20 MG/1
40 TABLET ORAL DAILY
Qty: 10 TABLET | Refills: 0 | Status: SHIPPED | OUTPATIENT
Start: 2025-05-12 | End: 2025-05-17

## 2025-05-12 ASSESSMENT — ENCOUNTER SYMPTOMS
SORE THROAT: 1
COUGH: 0
FEVER: 0
CHILLS: 0
HEADACHES: 0

## 2025-05-12 ASSESSMENT — PATIENT HEALTH QUESTIONNAIRE - PHQ9
1. LITTLE INTEREST OR PLEASURE IN DOING THINGS: NOT AT ALL
2. FEELING DOWN, DEPRESSED OR HOPELESS: NOT AT ALL
SUM OF ALL RESPONSES TO PHQ9 QUESTIONS 1 AND 2: 0

## 2025-05-12 ASSESSMENT — PAIN SCALES - GENERAL: PAINLEVEL_OUTOF10: 7

## 2025-05-12 NOTE — PROGRESS NOTES
"Subjective   Patient ID: Freya Sanz is a 39 y.o. female. They present today with a chief complaint of Sore Throat (X 3 days ).    History of Present Illness    Sore Throat   Associated symptoms include congestion. Pertinent negatives include no coughing, ear pain or headaches.       Patient presents to urgent care for complaints of sore throat for three days. She also reports congestion as well. Reports  negative covid tests at home x 2.    Past Medical History  Allergies as of 05/12/2025 - Reviewed 05/12/2025   Allergen Reaction Noted    Penicillins Unknown 05/11/2023       Prescriptions Prior to Admission[1]     Medical History[2]    Surgical History[3]     reports that she has been smoking cigarettes. She has never used smokeless tobacco. She reports current alcohol use. She reports that she does not use drugs.    Review of Systems  Review of Systems   Constitutional:  Negative for chills and fever.   HENT:  Positive for congestion and sore throat. Negative for ear pain.    Respiratory:  Negative for cough.    Neurological:  Negative for headaches.                                  Objective    Vitals:    05/12/25 1020   BP: 110/80   Pulse: 64   Resp: 16   Temp: 36.9 °C (98.4 °F)   SpO2: 98%   Weight: 79.4 kg (175 lb)   Height: 1.575 m (5' 2\")     No LMP recorded. (Menstrual status: OCP).    Physical Exam  Vitals reviewed.   HENT:      Head: Normocephalic.      Right Ear: Tympanic membrane, ear canal and external ear normal.      Left Ear: Tympanic membrane, ear canal and external ear normal.      Nose: Congestion present.      Mouth/Throat:      Mouth: Mucous membranes are moist.      Pharynx: Postnasal drip present.   Eyes:      Conjunctiva/sclera: Conjunctivae normal.   Cardiovascular:      Rate and Rhythm: Normal rate and regular rhythm.      Heart sounds: Normal heart sounds.   Pulmonary:      Effort: Pulmonary effort is normal.      Breath sounds: Normal breath sounds and air entry.   Skin:     General: " Skin is warm and dry.   Neurological:      Mental Status: She is alert.         Procedures    Point of Care Test & Imaging Results from this visit  Results for orders placed or performed in visit on 05/12/25   POCT SPOTFIRE R/ST Panel Mini w/Strep A ([employer]) manually resulted   Result Value Ref Range    POC Group A Strep, PCR Negative Negative    POC Respiratory Syncytial Virus PCR Negative Negative    POC Influenza A Virus PCR Negative Negative    POC Influenza B Virus PCR Negative Negative    POC Human Rhinovirus PCR Negative Negative      Imaging  No results found.    Cardiology, Vascular, and Other Imaging  No other imaging results found for the past 2 days      Diagnostic study results (if any) were reviewed by KEITH Junior.    Assessment/Plan   Allergies, medications, history, and pertinent labs/EKGs/Imaging reviewed by KEITH Junior.     Medical Decision Making  Strep spotfire PCR testing was negative. Symptoms likely related to allergies or viral infection. Will start patient on prednisone to help with symptoms. If symptoms are not improving or worsening in the next week, follow up with PCP or return to urgent care.    At time of discharge patient was clinically well-appearing and HDS for outpatient management. The patient and/or family was educated regarding diagnosis, supportive care, OTC and Rx medications. The patient and/or family was given the opportunity to ask questions prior to discharge.  They verbalized understanding of my discussion of the plans for treatment, expected course, indications to return to  or seek further evaluation in ED, and the need for timely follow up as directed.   They were provided with a work/school excuse if requested.      Orders and Diagnoses  Diagnoses and all orders for this visit:  Acute pharyngitis, unspecified etiology  -     predniSONE (Deltasone) 20 mg tablet; Take 2 tablets (40 mg) by mouth once daily for 5 days.  Sore throat  -      POCT SPOTFIRE R/ST Panel Mini w/Strep A ([employer]) manually resulted      Medical Admin Record      Patient disposition: Home    Electronically signed by KEITH Junior  10:46 AM           [1] (Not in a hospital admission)  [2]   Past Medical History:  Diagnosis Date    Major depressive disorder, single episode, in partial remission (CMS-[facility]) 05/15/2017    Depression, major, single episode, in partial remission    Other specified health status     No pertinent past surgical history    Other specified health status     No pertinent past medical history    Post-traumatic stress disorder, unspecified     Post-traumatic stress disorder    Viral upper respiratory tract infection 05/11/2023   [3]   Past Surgical History:  Procedure Laterality Date    MOUTH SURGERY  02/25/2016    Oral Surgery Tooth Extraction    TONSILLECTOMY  02/25/2016    Tonsillectomy

## 2025-05-12 NOTE — LETTER
May 12, 2025     Patient: Freya Sanz   YOB: 1985   Date of Visit: 5/12/2025       To Whom It May Concern:    It is my medical opinion that Freya Sanz may return to work on 5/13/25.    If you have any questions or concerns, please don't hesitate to call.         Sincerely,        Trish Degroot, MARK-CNP    CC: No Recipients

## 2025-05-15 ENCOUNTER — APPOINTMENT (OUTPATIENT)
Dept: BEHAVIORAL HEALTH | Facility: CLINIC | Age: 40
End: 2025-05-15
Payer: COMMERCIAL

## 2025-05-15 ENCOUNTER — OFFICE VISIT (OUTPATIENT)
Dept: PRIMARY CARE | Facility: CLINIC | Age: 40
End: 2025-05-15
Payer: COMMERCIAL

## 2025-05-15 VITALS
OXYGEN SATURATION: 97 % | SYSTOLIC BLOOD PRESSURE: 124 MMHG | DIASTOLIC BLOOD PRESSURE: 80 MMHG | WEIGHT: 180.4 LBS | BODY MASS INDEX: 33 KG/M2 | TEMPERATURE: 96.8 F | HEART RATE: 96 BPM

## 2025-05-15 DIAGNOSIS — H66.001 NON-RECURRENT ACUTE SUPPURATIVE OTITIS MEDIA OF RIGHT EAR WITHOUT SPONTANEOUS RUPTURE OF TYMPANIC MEMBRANE: Primary | ICD-10-CM

## 2025-05-15 DIAGNOSIS — T78.40XD ALLERGY, SUBSEQUENT ENCOUNTER: ICD-10-CM

## 2025-05-15 PROBLEM — T78.40XA ALLERGIES: Status: ACTIVE | Noted: 2025-05-15

## 2025-05-15 PROCEDURE — 99214 OFFICE O/P EST MOD 30 MIN: CPT | Performed by: NURSE PRACTITIONER

## 2025-05-15 RX ORDER — AZITHROMYCIN 250 MG/1
TABLET, FILM COATED ORAL
Qty: 6 TABLET | Refills: 0 | Status: SHIPPED | OUTPATIENT
Start: 2025-05-15 | End: 2025-05-20

## 2025-05-15 RX ORDER — BROMPHENIRAMINE MALEATE, PSEUDOEPHEDRINE HYDROCHLORIDE, AND DEXTROMETHORPHAN HYDROBROMIDE 2; 30; 10 MG/5ML; MG/5ML; MG/5ML
5 SYRUP ORAL 4 TIMES DAILY PRN
Qty: 120 ML | Refills: 0 | Status: SHIPPED | OUTPATIENT
Start: 2025-05-15 | End: 2025-05-25

## 2025-05-15 ASSESSMENT — PATIENT HEALTH QUESTIONNAIRE - PHQ9
2. FEELING DOWN, DEPRESSED OR HOPELESS: NOT AT ALL
1. LITTLE INTEREST OR PLEASURE IN DOING THINGS: NOT AT ALL
SUM OF ALL RESPONSES TO PHQ9 QUESTIONS 1 AND 2: 0

## 2025-05-15 ASSESSMENT — ENCOUNTER SYMPTOMS
STRIDOR: 0
COUGH: 1
SORE THROAT: 1
NECK PAIN: 0
HOARSE VOICE: 1
DEPRESSION: 0
VOMITING: 0
ABDOMINAL PAIN: 0
SHORTNESS OF BREATH: 0
HEADACHES: 1
DIARRHEA: 0
SWOLLEN GLANDS: 1
TROUBLE SWALLOWING: 0

## 2025-05-15 ASSESSMENT — PAIN SCALES - GENERAL: PAINLEVEL_OUTOF10: 0-NO PAIN

## 2025-05-15 NOTE — PROGRESS NOTES
Subjective   Patient ID: Freya Sanz is a 39 y.o. female who presents for Sick Visit and Allergies.    Sore Throat   This is a new problem. The current episode started in the past 7 days. The problem has been gradually worsening. There has been no fever. The pain is at a severity of 7/10. The pain is moderate. Associated symptoms include congestion, coughing, ear pain, headaches, a hoarse voice and swollen glands. Pertinent negatives include no abdominal pain, diarrhea, drooling, ear discharge, plugged ear sensation, neck pain, shortness of breath, stridor, trouble swallowing or vomiting.    Presented to  3 days ago and started Prednisone 40 mg, felt shaky after 3 days and cut dose in half  Today endorses laryngitis, right ear pain, PND, cough worse at night. Had been treating for allergies but symptoms kept getting worse    Review of Systems   HENT:  Positive for congestion, ear pain, hoarse voice and sore throat. Negative for drooling, ear discharge and trouble swallowing.    Respiratory:  Positive for cough. Negative for shortness of breath and stridor.    Gastrointestinal:  Negative for abdominal pain, diarrhea and vomiting.   Musculoskeletal:  Negative for neck pain.   Neurological:  Positive for headaches.       Objective   /80 (BP Location: Left arm, Patient Position: Sitting)   Pulse 96   Temp 36 °C (96.8 °F) (Temporal)   Wt 81.8 kg (180 lb 6.4 oz)   SpO2 97%   BMI 33.00 kg/m²     Physical Exam  Vitals reviewed.   Constitutional:       Appearance: Normal appearance.   HENT:      Head: Normocephalic and atraumatic.      Right Ear: Swelling and tenderness present. Tympanic membrane is erythematous and bulging.      Left Ear: Tympanic membrane normal.      Nose: Rhinorrhea present.      Mouth/Throat:      Pharynx: Oropharynx is clear. Posterior oropharyngeal erythema present.   Eyes:      Extraocular Movements: Extraocular movements intact.      Conjunctiva/sclera: Conjunctivae normal.       Pupils: Pupils are equal, round, and reactive to light.   Cardiovascular:      Rate and Rhythm: Normal rate and regular rhythm.   Pulmonary:      Effort: Pulmonary effort is normal. No respiratory distress.      Breath sounds: Rhonchi present. No wheezing.   Neurological:      General: No focal deficit present.      Mental Status: She is alert.   Psychiatric:         Mood and Affect: Mood normal.         Assessment/Plan   Problem List Items Addressed This Visit           ICD-10-CM    Non-recurrent acute suppurative otitis media of right ear without spontaneous rupture of tympanic membrane - Primary H66.001    Relevant Medications    brompheniramine-pseudoeph-DM 2-30-10 mg/5 mL syrup    azithromycin (Zithromax) 250 mg tablet    Allergies T78.40XA    Relevant Medications    brompheniramine-pseudoeph-DM 2-30-10 mg/5 mL syrup

## 2025-05-15 NOTE — PROGRESS NOTES
Answers submitted by the patient for this visit:  Sore Throat Questionnaire (Submitted on 5/15/2025)  Chief Complaint: Sore throat  Chronicity: new  Onset: in the past 7 days  Progression since onset: gradually worsening  Fever: no fever  pain severity now: moderate  Pain - numeric: 7/10  abdominal pain: No  congestion: Yes  cough: Yes  diarrhea: No  drooling: No  ear discharge: No  ear pain: Yes  headaches: Yes  hoarse voice: Yes  neck pain: No  plugged ear sensation: No  stridor: No  shortness of breath: No  swollen glands: Yes  trouble swallowing: No  vomiting: No

## 2025-05-21 ASSESSMENT — ENCOUNTER SYMPTOMS
HEADACHES: 1
COUGH: 1
VOMITING: 0
NECK PAIN: 0
DIARRHEA: 0
RHINORRHEA: 0
ABDOMINAL PAIN: 0
SORE THROAT: 0

## 2025-05-22 ENCOUNTER — APPOINTMENT (OUTPATIENT)
Dept: PRIMARY CARE | Facility: CLINIC | Age: 40
End: 2025-05-22
Payer: COMMERCIAL

## 2025-05-22 VITALS
OXYGEN SATURATION: 98 % | WEIGHT: 179.6 LBS | BODY MASS INDEX: 33.05 KG/M2 | SYSTOLIC BLOOD PRESSURE: 123 MMHG | TEMPERATURE: 97.2 F | HEIGHT: 62 IN | HEART RATE: 79 BPM | DIASTOLIC BLOOD PRESSURE: 70 MMHG

## 2025-05-22 DIAGNOSIS — R40.0 DAYTIME SLEEPINESS: ICD-10-CM

## 2025-05-22 DIAGNOSIS — H60.313 ACUTE DIFFUSE OTITIS EXTERNA OF BOTH EARS: Primary | ICD-10-CM

## 2025-05-22 DIAGNOSIS — R06.83 SNORES: ICD-10-CM

## 2025-05-22 PROCEDURE — RXMED WILLOW AMBULATORY MEDICATION CHARGE

## 2025-05-22 PROCEDURE — 3008F BODY MASS INDEX DOCD: CPT | Performed by: NURSE PRACTITIONER

## 2025-05-22 PROCEDURE — 99214 OFFICE O/P EST MOD 30 MIN: CPT | Performed by: NURSE PRACTITIONER

## 2025-05-22 RX ORDER — NEOMYCIN SULFATE, POLYMYXIN B SULFATE, HYDROCORTISONE 3.5; 10000; 1 MG/ML; [USP'U]/ML; MG/ML
2 SOLUTION/ DROPS AURICULAR (OTIC) 3 TIMES DAILY
Qty: 10 ML | Refills: 0 | Status: SHIPPED | OUTPATIENT
Start: 2025-05-22 | End: 2025-06-23

## 2025-05-22 ASSESSMENT — ENCOUNTER SYMPTOMS: DEPRESSION: 0

## 2025-05-22 ASSESSMENT — PAIN SCALES - GENERAL: PAINLEVEL_OUTOF10: 0-NO PAIN

## 2025-05-22 NOTE — PROGRESS NOTES
I was present with the APRN student who participated in the documentation of this note. I have personally seen and re-examined the patient and performed the medical decision-making components (assessment and plan of care). I have reviewed the APRN student documentation and verified the findings in the note as written with additions or exceptions as stated in the body of this note  Ale Hassan [facility]

## 2025-05-22 NOTE — PROGRESS NOTES
"Subjective   Patient ID: Freya Sanz is a 39 y.o. female who presents for Follow-up (1-week) for otitis media, sore throat, cough.    I was present with the APRN student who participated in the documentation of this note. I have personally seen and re-examined the patient and performed the medical decision-making components (assessment and plan of care). I have reviewed the APRN student documentation and verified the findings in the note as written with additions or exceptions as stated in the body of this note  Ale Hassan [facility]-BC      HPI   Pleasant established 38 y/o female presents for 1 week follow up for otitis media, sore throat, cough. Presented to  3 days prior to office visit 5/15/2025 where she was started on Prednisone 40 mg, felt shaky after 3 days and cut dose in half.    At visit still with c/o laryngitis, worsening right ear pain, PND, productive cough worse at night. Self treating for allergies but worsening. Completed Zpack, steroid regimen with good results. Still with fullness to ears without pain, noted cough, dark tan mucous once in the morning. Endorses drinking tea     Pt informs that she will be resting for next couple of days.    Snoring and daytime sleepiness- Reports she snores,  now sleeps in another room due to excess. Also with daytime sleepiness. Has discussed with sleep medicine NP who recommended home sleep study, she would like to follow with NP once completed, order placed today    Review of Systems  Constitutional: positive for daytime sleepiness, negative for fevers.  HENT:  Positive for congestion and hoarse voice. Negative for ear discharge or pain and/or trouble swallowing.    Respiratory:  Positive for cough. Negative for shortness of breath and stridor.    All other systems reviewed and are negative.  Objective   /70 (BP Location: Left arm, Patient Position: Sitting)   Pulse 79   Temp 36.2 °C (97.2 °F) (Temporal)   Ht 1.575 m (5' 2\")   Wt " 81.5 kg (179 lb 9.6 oz)   SpO2 98%   BMI 32.85 kg/m²     Physical Exam  Vitals reviewed.   Constitutional:       Appearance: Normal appearance.   HENT:      Head: Normocephalic and atraumatic.      Right Ear: Tympanic membrane normal. Swelling and tenderness present. No drainage.      Left Ear: Tympanic membrane normal. Swelling and tenderness present. No drainage.      Nose: Congestion present.      Mouth/Throat:      Pharynx: No posterior oropharyngeal erythema.   Eyes:      Conjunctiva/sclera: Conjunctivae normal.   Cardiovascular:      Rate and Rhythm: Normal rate and regular rhythm.   Pulmonary:      Effort: Pulmonary effort is normal.      Breath sounds: Normal breath sounds.   Musculoskeletal:         General: Normal range of motion.   Neurological:      General: No focal deficit present.      Mental Status: She is alert.   Psychiatric:         Mood and Affect: Mood normal.         Assessment/Plan   Problem List Items Addressed This Visit           ICD-10-CM    Acute diffuse otitis externa of both ears - Primary H60.313    Completed [place] Medrol  Improved          Relevant Medications    neomycin-polymyxin-HC (Cortisporin) otic solution    Snores R06.83    Relevant Orders    Home sleep apnea test (HSAT)    Daytime sleepiness R40.0    Relevant Orders    Home sleep apnea test (HSAT)

## 2025-05-24 ENCOUNTER — PHARMACY VISIT (OUTPATIENT)
Dept: PHARMACY | Facility: CLINIC | Age: 40
End: 2025-05-24
Payer: COMMERCIAL

## 2025-05-29 ENCOUNTER — APPOINTMENT (OUTPATIENT)
Dept: BEHAVIORAL HEALTH | Facility: CLINIC | Age: 40
End: 2025-05-29
Payer: COMMERCIAL

## 2025-05-29 DIAGNOSIS — F41.9 ANXIETY: ICD-10-CM

## 2025-05-29 DIAGNOSIS — F33.0 MILD EPISODE OF RECURRENT MAJOR DEPRESSIVE DISORDER: ICD-10-CM

## 2025-05-29 DIAGNOSIS — F41.0 PANIC DISORDER: ICD-10-CM

## 2025-05-29 PROCEDURE — 99212 OFFICE O/P EST SF 10 MIN: CPT | Performed by: CLINICAL NURSE SPECIALIST

## 2025-05-29 RX ORDER — CETIRIZINE HYDROCHLORIDE 10 MG/1
10 TABLET ORAL DAILY
COMMUNITY

## 2025-05-29 RX ORDER — BUPROPION HYDROCHLORIDE 150 MG/1
150 TABLET, EXTENDED RELEASE ORAL DAILY
Qty: 90 TABLET | Refills: 1 | Status: SHIPPED | OUTPATIENT
Start: 2025-05-29 | End: 2025-08-28

## 2025-05-29 RX ORDER — BUSPIRONE HYDROCHLORIDE 5 MG/1
5 TABLET ORAL 2 TIMES DAILY
Qty: 180 TABLET | Refills: 1 | Status: SHIPPED | OUTPATIENT
Start: 2025-05-29 | End: 2025-08-27

## 2025-05-29 RX ORDER — SERTRALINE HYDROCHLORIDE 100 MG/1
200 TABLET, FILM COATED ORAL DAILY
Qty: 180 TABLET | Refills: 1 | Status: SHIPPED | OUTPATIENT
Start: 2025-05-29 | End: 2025-08-27

## 2025-05-29 NOTE — PROGRESS NOTES
Outpatient Psychiatry      Subjective   Freya Sanz, a 39 y.o. female, presents for a routine follow up appointment as an established patient for medication management /outpatient psychiatry for a virtual appointment , for audio and video appointment , she was at home for this appointment      Virtual or Telephone Consent     An interactive audio and video telecommunication system which permits real time communications between the patient (at the originating site) and provider (at the distant site) was utilized to provide this telehealth service.   Verbal consent was requested and obtained from Freya Sanz on this date, 5/29/25 for a telehealth visit.       Diagnosis:    · mild Major depressive disorder, recurrent episode F33.0   · Panic disorder mild F41.0  Anxiety mild F41.9      Problem List         Patient Active Problem List   Diagnosis    Melanocytic nevi of lower extremity or hip    Bilateral myopia    Cafe au lait spots    Cervicalgia    Acute left-sided low back pain without sciatica    Chronic left-sided low back pain with left-sided sciatica    Closed nondisplaced fracture of proximal phalanx of lesser toe of right foot    Current smoker    Hemangioma of skin and subcutaneous tissue    History of vitamin D deficiency    HPV in female    Left lumbar radiculopathy    Major depressive disorder, recurrent episode, in full remission (CMS-HCC)    Melanocytic nevi of trunk    Melanocytic nevi of other parts of face    Myalgia    Nicotine use disorder    Other skin changes due to chronic exposure to nonionizing radiation    Other melanin hyperpigmentation    Postural strain    Right foot injury, initial encounter    Back pain    Pelvic pain in female    Sacral back pain    Sacroiliitis (CMS-HCC)    Segmental and somatic dysfunction    Shortness of breath on exertion    Spondylosis, lumbosacral    Sprain of right ankle, initial encounter    Vitamin D insufficiency    Abnormal uterine bleeding (AUB)    Acne  vulgaris    Anxiety and depression    ASCUS with positive high risk HPV cervical    Astigmatism, bilateral    Bertolotti's syndrome    Gastritis, acute    Anxiety    Stress due to family tension    Epigastric abdominal pain    Bloating    Grief         Treatment Plan/Recommendations:   Follow-up plan was discussed with patient.  8 weeks follow up , can continue self care and wellness efforts , can call  for treatment and scheduling concerns , can maintain routine health screenings   Psychotropic medications :   Continue Bupropion sr 150 mg , daily for depression   Continue Buspar 5 mg twice a day for anxiety   Continue Sertraline 100 mg 2 tablets daily for depression and anxiety     Review with patient: Treatment plan reviewed with the patient.  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   Work is busy   Insight is good   Sleep varies in quality   No panic attacks recently   no concerns about memory issues   no history of emanuel or hypomania   reconciled medication list in the [facility] emr   She is still processing grief from her brother's death     Psych ros and medical ros : as noted in hpi section of this note       Social History     Socioeconomic History    Marital status:      Spouse name: Not on file    Number of children: Not on file    Years of education: Not on file    Highest education level: Not on file   Occupational History    Not on file   Tobacco Use    Smoking status: Every Day     Current packs/day: 0.10     Average packs/day: 0.1 packs/day for 30.0 years (3.0 ttl pk-yrs)     Types: Cigarettes     Start date: 5/15/1995    Smokeless tobacco: Never   Vaping Use    Vaping status: Never Used   Substance and Sexual Activity    Alcohol use: Yes    Drug use: Never    Sexual activity: Defer   Other Topics  Concern    Not on file   Social History Narrative    Not on file     Social Drivers of Health     Financial Resource Strain: Not on file   Food Insecurity: Not on file   Transportation Needs: Not on file   Physical Activity: Not on file   Stress: Not on file   Social Connections: Not on file   Intimate Partner Violence: Not on file   Housing Stability: Not on file     Vitals:  There were no vitals filed for this visit.    Carol Hodges, APRN-CNS

## 2025-05-30 ENCOUNTER — PHARMACY VISIT (OUTPATIENT)
Dept: PHARMACY | Facility: CLINIC | Age: 40
End: 2025-05-30
Payer: COMMERCIAL

## 2025-05-30 PROCEDURE — RXMED WILLOW AMBULATORY MEDICATION CHARGE

## 2025-06-19 ENCOUNTER — APPOINTMENT (OUTPATIENT)
Dept: SLEEP MEDICINE | Facility: HOSPITAL | Age: 40
End: 2025-06-19
Payer: COMMERCIAL

## 2025-06-20 ENCOUNTER — APPOINTMENT (OUTPATIENT)
Dept: SLEEP MEDICINE | Facility: HOSPITAL | Age: 40
End: 2025-06-20
Payer: COMMERCIAL

## 2025-07-17 ENCOUNTER — CLINICAL SUPPORT (OUTPATIENT)
Dept: SLEEP MEDICINE | Facility: CLINIC | Age: 40
End: 2025-07-17
Payer: COMMERCIAL

## 2025-07-17 DIAGNOSIS — R06.83 SNORES: ICD-10-CM

## 2025-07-17 DIAGNOSIS — R40.0 DAYTIME SLEEPINESS: ICD-10-CM

## 2025-07-18 NOTE — PROGRESS NOTES
Type of Study: HOME SLEEP STUDY - NOMAD     The patient received equipment and instructions for use of the [employer] [facility] Nomad HSAT device. The patient was instructed how to apply the effort belts, cannula, thermistor. It was also explained how the Nomad and oximeter components work.  The patient was asked to record their sleep for an 8-hour period.     The patient was informed of their responsibility for the device and acknowledged this by signing the HSAT device contract. The patient was asked to return the device on 7/18/2025 between the hours of 6:30 am - 12 pm to [facility].     The patient was instructed to call 911 as usual for any medical- emergencies while at home.  The patient was also given a phone number for troubleshooting when using the device in case there were additional questions.

## 2025-08-05 DIAGNOSIS — G47.30 SLEEP APNEA, UNSPECIFIED TYPE: Primary | ICD-10-CM

## 2025-08-17 ENCOUNTER — APPOINTMENT (OUTPATIENT)
Dept: RADIOLOGY | Facility: HOSPITAL | Age: 40
End: 2025-08-17
Payer: COMMERCIAL

## 2025-08-17 ENCOUNTER — HOSPITAL ENCOUNTER (EMERGENCY)
Facility: HOSPITAL | Age: 40
Discharge: HOME | End: 2025-08-17
Attending: STUDENT IN AN ORGANIZED HEALTH CARE EDUCATION/TRAINING PROGRAM
Payer: COMMERCIAL

## 2025-08-17 VITALS
BODY MASS INDEX: 33.13 KG/M2 | HEIGHT: 62 IN | HEART RATE: 87 BPM | TEMPERATURE: 98.8 F | DIASTOLIC BLOOD PRESSURE: 69 MMHG | RESPIRATION RATE: 18 BRPM | SYSTOLIC BLOOD PRESSURE: 132 MMHG | WEIGHT: 180 LBS | OXYGEN SATURATION: 95 %

## 2025-08-17 DIAGNOSIS — R68.84 JAW PAIN: Primary | ICD-10-CM

## 2025-08-17 PROCEDURE — 70450 CT HEAD/BRAIN W/O DYE: CPT

## 2025-08-17 PROCEDURE — 70486 CT MAXILLOFACIAL W/O DYE: CPT

## 2025-08-17 PROCEDURE — 99284 EMERGENCY DEPT VISIT MOD MDM: CPT | Mod: 25 | Performed by: STUDENT IN AN ORGANIZED HEALTH CARE EDUCATION/TRAINING PROGRAM

## 2025-08-17 ASSESSMENT — PAIN DESCRIPTION - PAIN TYPE: TYPE: ACUTE PAIN

## 2025-08-17 ASSESSMENT — PAIN - FUNCTIONAL ASSESSMENT: PAIN_FUNCTIONAL_ASSESSMENT: 0-10

## 2025-08-17 ASSESSMENT — PAIN DESCRIPTION - ORIENTATION: ORIENTATION: RIGHT

## 2025-08-17 ASSESSMENT — PAIN DESCRIPTION - LOCATION: LOCATION: JAW

## 2025-08-17 ASSESSMENT — PAIN SCALES - GENERAL: PAINLEVEL_OUTOF10: 7

## 2025-08-28 ENCOUNTER — APPOINTMENT (OUTPATIENT)
Dept: BEHAVIORAL HEALTH | Facility: CLINIC | Age: 40
End: 2025-08-28
Payer: COMMERCIAL

## 2025-08-28 PROBLEM — F41.0 PANIC DISORDER: Status: ACTIVE | Noted: 2025-08-28

## 2025-08-28 PROBLEM — F33.0 MILD EPISODE OF RECURRENT MAJOR DEPRESSIVE DISORDER: Status: ACTIVE | Noted: 2025-08-28

## 2025-08-28 PROCEDURE — RXMED WILLOW AMBULATORY MEDICATION CHARGE

## 2025-08-29 ENCOUNTER — PHARMACY VISIT (OUTPATIENT)
Dept: PHARMACY | Facility: CLINIC | Age: 40
End: 2025-08-29
Payer: COMMERCIAL

## 2025-09-01 PROBLEM — F43.10 PTSD (POST-TRAUMATIC STRESS DISORDER): Status: ACTIVE | Noted: 2025-09-01

## 2025-11-20 ENCOUNTER — APPOINTMENT (OUTPATIENT)
Dept: BEHAVIORAL HEALTH | Facility: CLINIC | Age: 40
End: 2025-11-20
Payer: COMMERCIAL